# Patient Record
Sex: FEMALE | Race: WHITE | NOT HISPANIC OR LATINO | Employment: FULL TIME | URBAN - METROPOLITAN AREA
[De-identification: names, ages, dates, MRNs, and addresses within clinical notes are randomized per-mention and may not be internally consistent; named-entity substitution may affect disease eponyms.]

---

## 2017-03-26 ENCOUNTER — OFFICE VISIT (OUTPATIENT)
Dept: URGENT CARE | Age: 57
End: 2017-03-26
Payer: COMMERCIAL

## 2017-03-26 PROCEDURE — 99203 OFFICE O/P NEW LOW 30 MIN: CPT | Performed by: FAMILY MEDICINE

## 2017-11-21 ENCOUNTER — OFFICE VISIT (OUTPATIENT)
Dept: URGENT CARE | Age: 57
End: 2017-11-21
Payer: COMMERCIAL

## 2017-11-21 PROCEDURE — 99213 OFFICE O/P EST LOW 20 MIN: CPT

## 2017-11-22 NOTE — PROGRESS NOTES
Assessment    1  Acute sinusitis (461 9) (J01 90)    Plan  Acute sinusitis    · LevoFLOXacin 500 MG Oral Tablet (Levaquin); TAKE 1 TABLET DAILY UNTILFINISHED    Discussion/Summary  Discussion Summary:   Levaquin once daily until finished ( please take probiotics)  sinus medication as needed (try Flonase nasal spray 1 spray in each nostril twice a day)  or Advil / Motrin as needed  and swish mouth with warm salt water or mouthwash  / follow-up with family physician  Medication Side Effects Reviewed: Possible side effects of new medications were reviewed with the patient/guardian today  Understands and agrees with treatment plan: The treatment plan was reviewed with the patient/guardian  The patient/guardian understands and agrees with the treatment plan   Counseling Documentation With Imm: The patient was counseled regarding  Chief Complaint    1  Cold Symptoms  Chief Complaint Free Text Note Form: c/o of sore throat and sinus issues since Saturday      History of Present Illness  HPI: Sore throat and sinus issues; patient states she had a reaction to Augmentin in the past      Review of Systems  Focused-Female:  Constitutional: as noted in HPI   ENT: sore throat-- and-- nasal discharge, but-- as noted in HPI  Cardiovascular: no complaints of slow or fast heart rate, no chest pain, no palpitations, no leg claudication or lower extremity edema  Respiratory: no complaints of shortness of breath, no wheezing, no dyspnea on exertion, no orthopnea or PND  Breasts: no complaints of breast pain, breast lump or nipple discharge  Gastrointestinal: no complaints of abdominal pain, no constipation, no nausea or diarrhea, no vomiting, no bloody stools  Genitourinary: no complaints of dysuria, no incontinence, no pelvic pain, no dysmenorrhea, no vaginal discharge or abnormal vaginal bleeding  Musculoskeletal: no complaints of arthralgia, no myalgia, no joint swelling or stiffness, no limb pain or swelling  Integumentary: no complaints of skin rash or lesion, no itching or dry skin, no skin wounds  Neurological: no complaints of headache, no confusion, no numbness or tingling, no dizziness or fainting  ROS Reviewed:   ROS reviewed  Active Problems  1  Colonoscopy (Fiberoptic) Screening   2  Diarrhea (787 91) (R19 7)   3  Fatigue (780 79) (R53 83)   4  History of migraine (V12 49) (Z86 69)   5  Need for influenza vaccination (V04 81) (Z23)    Past Medical History  1  History of Acute infective pharyngitis (462) (J02 9)   2  History of Acute upper respiratory infection (465 9) (J06 9)   3  History of Anxiety disorder (300 00) (F41 9)   4  History of Depression (311) (F32 9)   5  History of hematuria (V13 09) (Z87 448)   6  History of migraine (V12 49) (Z86 69)   7  History of urinary tract infection (V13 02) (Z87 440)   8  History of Lyme disease (088 81) (A69 20)   9  History of Nephrolithiasis (V13 01)   10  History of Pain in joint of right shoulder region (719 41) (M25 511)   11  History of Pruritus (698 9) (L29 9)   12  History of Vitamin D deficiency (268 9) (E55 9)  Active Problems And Past Medical History Reviewed: The active problems and past medical history were reviewed and updated today  Family History  Mother    1  Family history of Breast Cancer (V16 3)   2  Family history of Hypothyroidism  Sister    3  Family history of Renal Disease  Brother    4  Family history of Prostate Cancer (V16 42)  Maternal Grandmother    5  Family history of Diabetes Mellitus (V18 0)  Paternal Grandmother    10  Family history of Diabetes Mellitus (V18 0)   7  Family history of Hypertension (V17 49)  Maternal Grandfather    8  Family history of Diabetes Mellitus (V18 0)  Paternal Grandfather    5  Family history of Diabetes Mellitus (V18 0)  Family History Reviewed: The family history was reviewed and updated today         Social History   · Being A Social Drinker   · Denied: History of Drug Use   · Never A Smoker  Social History Reviewed: The social history was reviewed and updated today  The social history was reviewed and is unchanged  Surgical History    1  History of Cholecystectomy Laparoscopic   2  History of Jaw Surgery   3  History of Oral Surgery Tooth Extraction   4  History of Tubal Ligation  Surgical History Reviewed: The surgical history was reviewed and updated today  Current Meds   1  Loperamide HCl - 2 MG Oral Capsule; Take 2 now, then 1 after each loose bowel movement  Use no more than 8 in 24 hours; Therapy: [id number] to Recorded   2  Multivitamins TABS; Therapy: ([id number]) to Recorded  Medication List Reviewed: The medication list was reviewed and updated today  Allergies    1  Topamax TABS   2  Inderal LA CP24    Vitals  Signs   Recorded: 21Nov2017 11:55AM   Temperature: 98 6 F  Heart Rate: 88  Respiration: 20  Systolic: 870  Diastolic: 52  Height: 5 ft 6 in  Weight: 145 lb   BMI Calculated: 23 4  BSA Calculated: 1 74  O2 Saturation: 98  Pain Scale: 7-8    Physical Exam   Constitutional  General appearance: No acute distress, well appearing and well nourished  Ears, Nose, Mouth, and Throat  External inspection of ears and nose: Normal    Otoscopic examination: Tympanic membranes translucent with normal light reflex  Canals patent without erythema  -- nasal congestion, tenderness over the sinuses with palpation  -- injection of the oropharynx  Pulmonary  Respiratory effort: No increased work of breathing or signs of respiratory distress  Auscultation of lungs: Clear to auscultation  Cardiovascular  Auscultation of heart: Normal rate and rhythm, normal S1 and S2, without murmurs  Lymphatic  Palpation of lymph nodes in neck: No lymphadenopathy  Skin good color and turgor  Neurologic grossly intact, no nuchal rigidity    Psychiatric  Orientation to person, place, and time: Normal    Mood and affect: Normal        Signatures   Electronically signed by : Mathew Rosales Vignesh Parmar DO; Nov 21 2017 12:06PM EST                       (Author)

## 2018-01-13 NOTE — PROGRESS NOTES
Assessment    1  Encounter for preventive health examination (V70 0) (Z00 00)   2  Pruritus (698 9) (L29 9)    Plan   Health Maintenance    · 1 - Tj CABRALES, Eliana Kruse  (Gastroenterology) Physician Referral  Consult, need for screening  colonoscopy  Status: Active  Requested for: [id number]  Care Summary provided  : Yes  Pruritus    · Loratadine 10 MG Oral Tablet; take 1 tablet daily as needed   · * MAMMO SCREENING BILATERAL W CAD; Status:Hold For - Scheduling; Requested  WIX:[id number];     (1) CBC/PLT/DIFF; Status:Resulted - Requires Verification;   Done: 16TFN2286 12:00AM  Due:30Jun2017;Ordered;    For:Health Maintenance, Pruritus; Ordered By:Linda Miramontes;   (1) COMPREHENSIVE METABOLIC PANEL; Status:Resulted - Requires Verification;   Done: 71JYG4561 12:00AM  Due:30Jun2017;Ordered;    For:Health Maintenance, Pruritus; Ordered By:Linda Miramontes;   (1) LIPID PANEL, FASTING; Status:Resulted - Requires Verification;   Done: 28EQG2812 12:00AM  Due:30Jun2017;Ordered; For:Pruritus; Ordered By:Linda Miramontes;      Discussion/Summary  healthy adult female Currently, she eats a healthy diet and has an adequate exercise regimen  Breast cancer screening: the risks and benefits of breast cancer screening were discussed and mammogram has been ordered  Colorectal cancer screening: the risks and benefits of colorectal cancer screening were discussed and colonoscopy has been ordered  Health Maintenance- Will order screening labs, colonoscopy and mammogram  Continue with varied diet and at least 150 minutes of moderate intensity cardiovascular exercise weekly  Pruritis- Will trial zyrtec at this time  Counselled on proper hydration with vaseline after a shower  Avoid extremes in water temperature when bathing         The patient was counseled regarding instructions for management, risk factor reductions, patient and family education, impressions, risks and benefits of treatment options, importance of compliance with treatment  Self Referrals: No      Chief Complaint  Annual physical      History of Present Illness  , Adult Female: The patient is being seen for a health maintenance evaluation  General Health: The patient's health since the last visit is described as good  She has regular dental visits  She denies vision problems  She denies hearing loss  Immunizations status: up to date  Lifestyle:  She consumes a diverse and healthy diet  She does not have any weight concerns  She exercises regularly  She does not use tobacco  She consumes alcohol  She denies drug use  Screening:   HPI: 56 yo F comes to the office for annual exam  She admits she does not come to the office regularly  She is in generally good health with no major concerns  She sees a dentist regularly and brushes her teeth two times per day  She has no vision problems  She eats a well varied diet and gets regular exercise in the form of walking  She does have concerns of itching, particularly when she gets out of the shower  She has used different types of soaps and body washes, but she seems to get dry, itchy skin after she bathes  No rash or open lesions  Review of Systems    Constitutional: No fever, no chills, feels well, no tiredness, no recent weight gain or weight loss  Eyes: No complaints of eye pain, no red eyes, no eyesight problems, no discharge, no dry eyes, no itching of eyes  ENT: no complaints of earache, no loss of hearing, no nose bleeds, no nasal discharge, no sore throat, no hoarseness  Cardiovascular: No complaints of slow heart rate, no fast heart rate, no chest pain, no palpitations, no leg claudication, no lower extremity edema  Respiratory: No complaints of shortness of breath, no wheezing, no cough, no SOB on exertion, no orthopnea, no PND  Gastrointestinal: No complaints of abdominal pain, no constipation, no nausea or vomiting, no diarrhea, no bloody stools     Genitourinary: No complaints of dysuria, no incontinence, no pelvic pain, no dysmenorrhea, no vaginal discharge or bleeding  Musculoskeletal: No complaints of arthralgias, no myalgias, no joint swelling or stiffness, no limb pain or swelling  Integumentary: itching, but no rashes, no breast pain, no skin lesions, no skin wound and no breast lump  Neurological: No complaints of headache, no confusion, no convulsions, no numbness, no dizziness or fainting, no tingling, no limb weakness, no difficulty walking  Psychiatric: Not suicidal, no sleep disturbance, no anxiety or depression, no change in personality, no emotional problems  Endocrine: No complaints of proptosis, no hot flashes, no muscle weakness, no deepening of the voice, no feelings of weakness  Hematologic/Lymphatic: No complaints of swollen glands, no swollen glands in the neck, does not bleed easily, does not bruise easily  ROS reviewed  Active Problems    1  Acute upper respiratory infection (465 9) (J06 9)   2  Colonoscopy (Fiberoptic) Screening   3  Fatigue (780 79) (R53 83)   4  History of migraine (V12 49) (Z86 69)   5  Need for influenza vaccination (V04 81) (Z23)   6   Urinary tract infection (599 0) (N39 0)    Past Medical History    · History of Acute infective pharyngitis (462) (J02 9)   · History of Anxiety disorder (300 00) (F41 9)   · History of Depression (311) (F32 9)   · History of hematuria (V13 09) (Z87 448)   · History of migraine (V12 49) (Z86 69)   · History of Lyme disease (088 81) (A69 20)   · History of Nephrolithiasis (V13 01)   · History of Pain in joint of right shoulder region (719 41) (M25 511)   · History of Vitamin D deficiency (268 9) (E55 9)    Surgical History    · History of Cholecystectomy Laparoscopic   · History of Jaw Surgery   · History of Oral Surgery Tooth Extraction   · History of Tubal Ligation    Family History  Mother    · Family history of Breast Cancer (V16 3)   · Family history of Hypothyroidism  Sister    · Family history of Renal Disease  Brother    · Family history of Prostate Cancer (V16 42)  Maternal Grandmother    · Family history of Diabetes Mellitus (V18 0)  Paternal Grandmother    · Family history of Diabetes Mellitus (V18 0)   · Family history of Hypertension (V17 49)  Maternal Grandfather    · Family history of Diabetes Mellitus (V18 0)  Paternal Grandfather    · Family history of Diabetes Mellitus (V18 0)    Social History    · Being A Social Drinker   · Denied: History of Drug Use   · Never A Smoker    Current Meds   1  Multivitamins TABS; Therapy: ([id number]) to Recorded    Allergies    1  Topamax TABS   2  Inderal LA CP24    Vitals   Recorded: 46AQD4343 67:37UF   Systolic 816   Diastolic 66   Heart Rate 78   Respiration 16   Temperature 98 4 F   Pain Scale 0   Height 5 ft 6 5 in   Weight 146 lb 2 oz   BMI Calculated 23 23   BSA Calculated 1 76     Physical Exam    Constitutional   General appearance: No acute distress, well appearing and well nourished  Head and Face   Head and face: Normal     Palpation of the face and sinuses: No sinus tenderness  Eyes   Conjunctiva and lids: No swelling, erythema or discharge  Pupils and irises: Equal, round, reactive to light  Ears, Nose, Mouth, and Throat   External inspection of ears and nose: Normal     Otoscopic examination: Tympanic membranes translucent with normal light reflex  Canals patent without erythema  Hearing: Normal     Nasal mucosa, septum, and turbinates: Normal without edema or erythema  Lips, teeth, and gums: Normal, good dentition  Oropharynx: Normal with no erythema, edema, exudate or lesions  Neck   Neck: Supple, symmetric, trachea midline, no masses  Thyroid: Normal, no thyromegaly  Pulmonary   Respiratory effort: No increased work of breathing or signs of respiratory distress  Auscultation of lungs: Clear to auscultation  Cardiovascular   Auscultation of heart: Normal rate and rhythm, normal S1 and S2, no murmurs  Examination of extremities for edema and/or varicosities: Normal     Abdomen   Abdomen: Non-tender, no masses  Liver and spleen: No hepatomegaly or splenomegaly  Lymphatic   Palpation of lymph nodes in neck: No lymphadenopathy  Musculoskeletal   Gait and station: Normal     Digits and nails: Normal without clubbing or cyanosis  Joints, bones, and muscles: Normal     Range of motion: Normal     Stability: Normal     Muscle strength/tone: Normal     Skin   Skin and subcutaneous tissue: Normal without rashes or lesions  Palpation of skin and subcutaneous tissue: Normal turgor  NO rash, no current puritis  Neurologic   Cranial nerves: Cranial nerves II-XII intact  Reflexes: 2+ and symmetric  Sensation: No sensory loss  Psychiatric   Judgment and insight: Normal     Orientation to person, place, and time: Normal     Recent and remote memory: Intact  Mood and affect: Normal        Attending Note  Attending Note: Attending Note: I discussed the case with the Resident and reviewed the Resident's note, I supervised the Resident and I agree with the Resident management plan as it was presented to me  Level of Participation: I was present in clinic, but did not examine the patient  Signatures   Electronically signed by :  Mili Anthony DO; Jul 29 2016  7:49PM EST                       (Author)    Electronically signed by : Telma Augustine DO; Aug  3 2016 10:14PM EST                       (Author)

## 2018-01-31 ENCOUNTER — OFFICE VISIT (OUTPATIENT)
Dept: URGENT CARE | Age: 58
End: 2018-01-31
Payer: COMMERCIAL

## 2018-01-31 VITALS
DIASTOLIC BLOOD PRESSURE: 57 MMHG | SYSTOLIC BLOOD PRESSURE: 120 MMHG | BODY MASS INDEX: 24.43 KG/M2 | HEART RATE: 60 BPM | HEIGHT: 65 IN | OXYGEN SATURATION: 99 % | WEIGHT: 146.6 LBS | TEMPERATURE: 97.7 F | RESPIRATION RATE: 18 BRPM

## 2018-01-31 DIAGNOSIS — R42 VERTIGO: Primary | ICD-10-CM

## 2018-01-31 PROCEDURE — 99213 OFFICE O/P EST LOW 20 MIN: CPT | Performed by: FAMILY MEDICINE

## 2018-01-31 PROCEDURE — 93005 ELECTROCARDIOGRAM TRACING: CPT | Performed by: NURSE PRACTITIONER

## 2018-01-31 RX ORDER — MECLIZINE HYDROCHLORIDE 25 MG/1
25 TABLET ORAL EVERY 8 HOURS PRN
Qty: 15 TABLET | Refills: 0 | Status: SHIPPED | OUTPATIENT
Start: 2018-01-31 | End: 2020-02-10

## 2018-02-01 NOTE — PATIENT INSTRUCTIONS
Rest   Stay hydrate  Start meclizine as prescribed  If symptoms persist or worsen, you develop any new symptoms Benign Paroxysmal Positional Vertigo   WHAT YOU NEED TO KNOW:   BPPV is an inner ear condition that causes you to suddenly feel dizzy  Benign means it is not serious or life-threatening  BPPV is caused by a problem with the nerves and structure of your inner ear  BPPV happens when small pieces of calcium break loose and lump together in one of your inner ear canals  DISCHARGE INSTRUCTIONS:   Return to the emergency department if:   · You fall during a BPPV episode and are injured  · You have a severe headache that does not go away  · You have new changes in your vision or feel weak or confused  · You have problems hearing, or you have ringing or buzzing in your ears  Contact your healthcare provider if:   · Your BPPV symptoms do not go away or they return  · You have problems with your balance, or you are falling often  · You have new or increased nausea or vomiting with vertigo  · You feel anxious or depressed and do not want to leave your home  · You have questions or concerns about your condition or care  Medicines:   · Medicines  may be recommended or prescribed to treat dizziness or nausea  · Take your medicine as directed  Contact your healthcare provider if you think your medicine is not helping or if you have side effects  Tell him of her if you are allergic to any medicine  Keep a list of the medicines, vitamins, and herbs you take  Include the amounts, and when and why you take them  Bring the list or the pill bottles to follow-up visits  Carry your medicine list with you in case of an emergency  Prevent your symptoms:   · Try to avoid sudden head movements  Stand up and lie down slowly  · Raise and support your head when you lie down  Place pillows under your upper back and head or rest in a recliner       · Change your position often when you are lying down   Try not to lie with your head on the same side for long periods of time  Roll over slowly  · Wear protective gear  when you ride a bike or play sports  A helmet helps protect your head from injury  Follow up with your healthcare provider as directed: You may need to return in 1 month to check the progress of your treatment  Write down your questions so you remember to ask them during your visits  © 2017 [employer]  Information is for End User's use only and may not be sold, redistributed or otherwise used for commercial purposes  All illustrations and images included in CareNotes® are the copyrighted property of [employer]  or [employer]  The above information is an  only  It is not intended as medical advice for individual conditions or treatments  Talk to your doctor, nurse or pharmacist before following any medical regimen to see if it is safe and effective for you  go directly to ER for full work up  EKG: sinus suleiman  Follow up with PCP in the next 2-3 days

## 2018-02-01 NOTE — PROGRESS NOTES
Assessment/Plan:     Diagnoses and all orders for this visit:    Vertigo  -     meclizine (ANTIVERT) 25 mg tablet; Take 1 tablet (25 mg total) by mouth every 8 (eight) hours as needed for dizziness for up to 5 days    Other orders  -     ECG 12 lead    Rest   Stay hydrate  Start meclizine as prescribed  If symptoms persist or worsen, you develop any new symptoms  Follow up with PCP in next 2-3 days  Subjective:      Patient ID: Sam Crawford is a 65 y o  female  This is a 65year old female here today with 3 episodes of dizziness  She states last week she was sitting at her desk and tilted her head  She then felt dizzy and off balance  No chest pain, SOB, numbness or tingling associated with episode  She felt as though things were spinning  Yesterday and Last night she had another episode with onset of dizziness with moving her head  She states when she layed down things were spinning when she laid down on her left side  She feels as though things are spinning when she moves her head  No visual changes, no headaches  She had some nausea yesterday with episode  No history of vertigo  At this time she feels slightly off balance  No URI symptoms  Dizziness   Associated symptoms include nausea  Pertinent negatives include no chest pain, congestion, coughing, fatigue, fever, numbness or weakness  Nausea   Associated symptoms include nausea  Pertinent negatives include no chest pain, congestion, coughing, fatigue, fever, numbness or weakness  The following portions of the patient's history were reviewed and updated as appropriate: allergies, current medications, past family history, past medical history, past social history, past surgical history and problem list     Review of Systems   Constitutional: Negative for activity change, fatigue and fever  HENT: Negative for congestion, sinus pain, sinus pressure and tinnitus  Respiratory: Negative for cough and shortness of breath  Cardiovascular: Negative for chest pain and palpitations  Gastrointestinal: Positive for nausea  Neurological: Positive for dizziness  Negative for tremors, syncope, weakness, light-headedness and numbness  Objective:  EKG: sinus suleiman   /57 (BP Location: Right arm, Patient Position: Sitting, Cuff Size: Standard)   Pulse 60   Temp 97 7 °F (36 5 °C) (Oral)   Resp 18   Ht 5' 5" (1 651 m)   Wt 66 5 kg (146 lb 9 6 oz)   SpO2 99%   BMI 24 40 kg/m²      Physical Exam   Constitutional: She is oriented to person, place, and time  She appears well-developed and well-nourished  HENT:   Head: Normocephalic  Eyes: Conjunctivae are normal  Pupils are equal, round, and reactive to light  Neck: Normal range of motion  Neck supple  Cardiovascular: Normal rate and regular rhythm  Pulmonary/Chest: Effort normal and breath sounds normal    Neurological: She is alert and oriented to person, place, and time  Coordination normal    Negative Rhomberg    Skin: Skin is warm  Psychiatric: She has a normal mood and affect

## 2018-02-01 NOTE — PROGRESS NOTES
Had episode of dizziness last week - lasted one afternoon  Reoccurred during night and continues with feeling "off balance" and "room spinning" when supine  Intermittent nausea  No visual changes

## 2018-02-02 LAB
ATRIAL RATE: 57 BPM
P AXIS: 64 DEGREES
PR INTERVAL: 130 MS
QRS AXIS: 68 DEGREES
QRSD INTERVAL: 84 MS
QT INTERVAL: 388 MS
QTC INTERVAL: 377 MS
T WAVE AXIS: 45 DEGREES
VENTRICULAR RATE: 57 BPM

## 2018-04-19 ENCOUNTER — OFFICE VISIT (OUTPATIENT)
Dept: FAMILY MEDICINE CLINIC | Facility: CLINIC | Age: 58
End: 2018-04-19
Payer: COMMERCIAL

## 2018-04-19 VITALS
RESPIRATION RATE: 16 BRPM | BODY MASS INDEX: 23.27 KG/M2 | HEIGHT: 66 IN | WEIGHT: 144.8 LBS | HEART RATE: 66 BPM | SYSTOLIC BLOOD PRESSURE: 110 MMHG | TEMPERATURE: 97 F | DIASTOLIC BLOOD PRESSURE: 70 MMHG

## 2018-04-19 DIAGNOSIS — Z12.11 SCREEN FOR COLON CANCER: ICD-10-CM

## 2018-04-19 DIAGNOSIS — R42 DIZZINESS: ICD-10-CM

## 2018-04-19 DIAGNOSIS — Z12.39 SCREENING FOR BREAST CANCER: ICD-10-CM

## 2018-04-19 DIAGNOSIS — Z00.00 HEALTHCARE MAINTENANCE: Primary | ICD-10-CM

## 2018-04-19 PROCEDURE — 99396 PREV VISIT EST AGE 40-64: CPT | Performed by: FAMILY MEDICINE

## 2018-04-19 NOTE — PROGRESS NOTES
Rufina Meraz 1960 female MRN: [id number]    Health Maintenance Visit      SUBJECTIVE  HPI:  Lord Armijo is a 63 y o  female who presented for a routine health maintenance visit  The patient has been in otherwise good general health in the past   C/o occasional dizziness, no vertigo, no other associated symptoms and denies dehydration or orthostatic symptoms, described in ROS  Currently with mild URI, resolving, no cough, just runny nose and some congestion  Has never had a colonoscopy  "Been a while" since she got her mammogram   Previous PAPs were normal  S/p tubal ligation  5-10 years ago was last pap  Health Maintenance   Topic Date Due    HIV SCREENING  1960    Hepatitis C Screening  1960    COLONOSCOPY  1960    INFLUENZA VACCINE  09/01/2017    Depression Screening PHQ-9  01/31/2019    DTaP,Tdap,and Td Vaccines (2 - Td) 01/07/2023       Immunization History   Administered Date(s) Administered    Tdap 01/07/2013       Review of Systems   Constitutional: Negative for activity change, appetite change, chills, diaphoresis, fatigue, fever and unexpected weight change  HENT: Negative for sinus pain and sinus pressure  Eyes: Negative for visual disturbance  Respiratory: Negative for apnea, cough, chest tightness, shortness of breath and wheezing  Cardiovascular: Negative for chest pain, palpitations and leg swelling  Gastrointestinal: Negative for abdominal pain, constipation, diarrhea and vomiting  Genitourinary: Negative for vaginal bleeding  Musculoskeletal: Negative for gait problem  Skin: Negative for rash  Neurological: Positive for dizziness  Negative for seizures, syncope, weakness, light-headedness and headaches  Initially began 1 month ago, lasted 48 hours, self-resolved  Has occurred 3 times since then, lasting 24 hours the next time, then lasting only a few hours the 3rd time, better with rest, worse on R side one of the times  Psychiatric/Behavioral: Negative for dysphoric mood and suicidal ideas  Historical Information   Past Medical History:   Diagnosis Date    Anxiety disorder     Last Assessed:11/12/2013    Hematuria     Last Assessed:12/10/2012    Lyme disease     Last Assessed:12/10/2012    Migraine     Last Assessed:3/20/2014    Vitamin D deficiency     Last Assessed:3/20/2014     Past Surgical History:   Procedure Laterality Date    CHOLECYSTECTOMY      MANDIBLE FRACTURE SURGERY      MANDIBLE SURGERY      wired after broken    TUBAL LIGATION      WISDOM TOOTH EXTRACTION      WRIST SURGERY       Social History   History   Alcohol Use    0 6 oz/week    1 Glasses of wine per week     Comment: monthly ; social     History   Drug Use No     History   Smoking Status    Never Smoker   Smokeless Tobacco    Never Used     Family History:   Family History   Problem Relation Age of Onset   Lizy Phelan Breast cancer Mother     Hypothyroidism Mother     Other Sister      renal disease    Prostate cancer Brother     Diabetes Maternal Grandmother     Diabetes Maternal Grandfather     Diabetes Paternal Grandmother     Hypertension Paternal Grandmother     Diabetes Paternal Grandfather        Medications:  Meds/Allergies   Current Outpatient Prescriptions   Medication Sig Dispense Refill    meclizine (ANTIVERT) 25 mg tablet Take 1 tablet (25 mg total) by mouth every 8 (eight) hours as needed for dizziness for up to 5 days 15 tablet 0     No current facility-administered medications for this visit          Allergies   Allergen Reactions    Propranolol Fatigue    Augmentin [Amoxicillin-Pot Clavulanate] Rash    Topiramate Other (See Comments)     depression       OBJECTIVE  Vitals:   Vitals:    04/19/18 1621   Weight: 65 7 kg (144 lb 12 8 oz)   Height: 5' 5 6" (1 666 m)       Invasive Devices          No matching active lines, drains, or airways          No exam data present     Physical Exam   Constitutional: She is oriented to person, place, and time  She appears well-developed and well-nourished  No distress  HENT:   Head: Normocephalic and atraumatic  Right Ear: External ear normal    Left Ear: External ear normal    Mouth/Throat: Oropharynx is clear and moist  No oropharyngeal exudate  Eyes: Conjunctivae and EOM are normal  Pupils are equal, round, and reactive to light  Neck: Normal range of motion  Cardiovascular: Normal rate, regular rhythm and normal heart sounds  No murmur heard  Pulmonary/Chest: Effort normal and breath sounds normal  No respiratory distress  She has no wheezes  She has no rales  Abdominal: Soft  Bowel sounds are normal  She exhibits no distension  There is no tenderness  There is no rebound and no guarding  Musculoskeletal: Normal range of motion  She exhibits no edema  Neurological: She is alert and oriented to person, place, and time  No cranial nerve deficit  She exhibits normal muscle tone  Coordination normal    Skin: No rash noted  She is not diaphoretic  No pallor  Psychiatric: She has a normal mood and affect  Her behavior is normal    Vitals reviewed  Lab:  I have personally reviewed all prior pertinent results  Office Visit on 01/31/2018   Component Date Value Ref Range Status    Ventricular Rate 01/31/2018 57  BPM Final    Atrial Rate 01/31/2018 57  BPM Final    MD Interval 01/31/2018 130  ms Final    QRSD Interval 01/31/2018 84  ms Final    QT Interval 01/31/2018 388  ms Final    QTC Interval 01/31/2018 377  ms Final    P Axis 01/31/2018 64  degrees Final    QRS Axis 01/31/2018 68  degrees Final    T Wave Axis 01/31/2018 45  degrees Final   ]    Imaging:  I have personally reviewed all prior pertinent results  Assessment/Plan     Problem List Items Addressed This Visit     None      Visit Diagnoses     Healthcare maintenance    -  Primary        HM  - Vaccines: Per review above     - Mammogram: Ordered referral   - PAP: Recommended scheduling   - Colonoscopy: Ordered referral     Body mass index is 23 66 kg/m²  The ASCVD Risk score (Ronaldo Mei , et al , 2013) failed to calculate for the following reasons:    ASCVD risk score not calculated  -> Counseled on diet, weight maintenance, aerobic and weight-bearing exercise  PMH, stable, chronic  -> Continuing all home medications or alternatives as reviewed and reconciled  Disposition: Anticipate stable interim  - Code: Full   - Diet: Standard American Diet, recommended Heart Healthy  - Ambulation: No dysfunction at baseline   - Housing: Home  Self-care  - PCP: Dr Clifford Hwang, [facility]   - Follow-up recommendations:     No future appointments         _____________________________________________________________________   The attending physician, Dr Hunter Gordon, agreed with the plan      Reji Schroeder DO, PGY-3  [facility]   4/19/2018

## 2018-06-03 LAB — TSH SERPL-ACNC: 2.67 MIU/L (ref 0.4–4.5)

## 2018-06-04 ENCOUNTER — ANNUAL EXAM (OUTPATIENT)
Dept: FAMILY MEDICINE CLINIC | Facility: CLINIC | Age: 58
End: 2018-06-04
Payer: COMMERCIAL

## 2018-06-04 VITALS
SYSTOLIC BLOOD PRESSURE: 110 MMHG | HEART RATE: 80 BPM | RESPIRATION RATE: 16 BRPM | DIASTOLIC BLOOD PRESSURE: 70 MMHG | BODY MASS INDEX: 23.59 KG/M2 | WEIGHT: 146.8 LBS | TEMPERATURE: 97.6 F | HEIGHT: 66 IN

## 2018-06-04 DIAGNOSIS — Z01.419 ENCNTR FOR GYN EXAM (GENERAL) (ROUTINE) W/O ABN FINDINGS: Primary | ICD-10-CM

## 2018-06-04 DIAGNOSIS — Z12.4 PAP SMEAR FOR CERVICAL CANCER SCREENING: ICD-10-CM

## 2018-06-04 PROCEDURE — G0145 SCR C/V CYTO,THINLAYER,RESCR: HCPCS | Performed by: FAMILY MEDICINE

## 2018-06-04 PROCEDURE — 87624 HPV HI-RISK TYP POOLED RSLT: CPT | Performed by: FAMILY MEDICINE

## 2018-06-04 PROCEDURE — 99213 OFFICE O/P EST LOW 20 MIN: CPT | Performed by: FAMILY MEDICINE

## 2018-06-04 NOTE — PROGRESS NOTES
Eleonora Meraz 1960 female MRN: [id number]    CC: Annual Gyn     HPI: Nay Parker is a 63 y o  G5K5 uncomplicated full term deliveries who presents for pap  Past Medical History:  Past Medical History:   Diagnosis Date    Anxiety disorder     Last Assessed:11/12/2013    Hematuria     Last Assessed:12/10/2012    Lyme disease     Last Assessed:12/10/2012    Migraine     Last Assessed:3/20/2014    Vitamin D deficiency     Last Assessed:3/20/2014       Past Surgical History:  Past Surgical History:   Procedure Laterality Date    CHOLECYSTECTOMY      MANDIBLE FRACTURE SURGERY      MANDIBLE SURGERY      wired after broken    TUBAL LIGATION      WISDOM TOOTH EXTRACTION      WRIST SURGERY         Past OB/Gyn History:  OB History     No data available      Her menstrual cycles are no longer present  Denies any history of sexually transmitted infection  No history of abnormal pap smears  Her last pap smear was years ago- unknown exact date  Family History:  Family History   Problem Relation Age of Onset   Levora Reed Breast cancer Mother     Hypothyroidism Mother     Other Sister      renal disease    Prostate cancer Brother     Diabetes Maternal Grandmother     Diabetes Maternal Grandfather     Diabetes Paternal Grandmother     Hypertension Paternal Grandmother     Diabetes Paternal Grandfather        Social History:  Social History     Social History    Marital status: /Civil Union     Spouse name: N/A    Number of children: N/A    Years of education: N/A     Occupational History    Not on file       Social History Main Topics    Smoking status: Never Smoker    Smokeless tobacco: Never Used    Alcohol use 0 6 oz/week     1 Glasses of wine per week      Comment: monthly ; social    Drug use: No    Sexual activity: Not on file     Other Topics Concern    Not on file     Social History Narrative    No narrative on file         Review of Systems:  Skin: No rashes or discolorations of any concern  RESP: Denies SOB, no cough  CV: Denies chest pain or palpitations  Breasts: Denies masses, pain and nipple discharge  GI: Denies abdominal pain  Denies changes in bowel habits  : Denies dysuria, frequency, incontinence and hematuria  Genitalia: Denies abnormal vaginal discharge  Physical Exam:  There were no vitals taken for this visit  GEN: The patient was alert and oriented x3, pleasant well-appearing female in no acute distress  HEENT:  Unremarkable, no anterior or posterior lymphadenopathy, no thyromegaly  CV:  RRR, no murmurs  BREASTS: Breasts:  Symmetric  No dominant, discrete, fixed  or suspicious masses are noted  No skin or nipple changes  No palpable axillary nodes  RESP:  Clear to auscultation bilaterally  ABD:  Soft, nontender, nondistended, no masses or organomegaly  BACK:  No CVA tenderness, no tenderness to palpation along spine  PELVIC:  Normal appearing external female genitalia, normal vaginal epithelium, no abnormal discharge  Normal appearing cervix  Uterus is smooth, mobile and, nontender  No palpable adnexal masses  No anoperineal lesions  Rectovaginal exam was WNL  A thin prep pap smear was obtained  SKIN:  No concerning lesions  EXTREMITIES: No edema      Assessment & Plan:   1  Routine annual exam    We discussed ACOG guidelines for pap smear screening frequency  We discussed healthy diet, exercise, self breast awareness  RTC one year or earlier PRN  The attending physician, Dr Jody Henderson, agreed with the plan      Ban Swann DO, PGY-3  [place] [id number]

## 2018-06-07 LAB — HPV RRNA GENITAL QL NAA+PROBE: NORMAL

## 2018-06-08 LAB
LAB AP GYN PRIMARY INTERPRETATION: NORMAL
Lab: NORMAL

## 2018-06-11 NOTE — PROGRESS NOTES
Please call the patient and let her know her pap smear was normal  She is due for another PAP in 5 years, however she should continue to receive yearly mammograms  Thanks!

## 2019-10-25 ENCOUNTER — OFFICE VISIT (OUTPATIENT)
Dept: FAMILY MEDICINE CLINIC | Facility: CLINIC | Age: 59
End: 2019-10-25

## 2019-10-25 VITALS
WEIGHT: 148.2 LBS | BODY MASS INDEX: 24.07 KG/M2 | SYSTOLIC BLOOD PRESSURE: 102 MMHG | DIASTOLIC BLOOD PRESSURE: 78 MMHG | RESPIRATION RATE: 16 BRPM | HEART RATE: 68 BPM | TEMPERATURE: 97.9 F

## 2019-10-25 DIAGNOSIS — R30.0 DYSURIA: Primary | ICD-10-CM

## 2019-10-25 LAB
BACTERIA UR QL AUTO: NORMAL /HPF
BILIRUB UR QL STRIP: NEGATIVE
CLARITY UR: CLEAR
COLOR UR: YELLOW
GLUCOSE UR STRIP-MCNC: NEGATIVE MG/DL
HGB UR QL STRIP.AUTO: ABNORMAL
HYALINE CASTS #/AREA URNS LPF: NORMAL /LPF
KETONES UR STRIP-MCNC: NEGATIVE MG/DL
LEUKOCYTE ESTERASE UR QL STRIP: NEGATIVE
NITRITE UR QL STRIP: NEGATIVE
NON-SQ EPI CELLS URNS QL MICRO: NORMAL /HPF
PH UR STRIP.AUTO: 5.5 [PH]
PROT UR STRIP-MCNC: NEGATIVE MG/DL
RBC #/AREA URNS AUTO: NORMAL /HPF
SL AMB  POCT GLUCOSE, UA: NEGATIVE
SL AMB LEUKOCYTE ESTERASE,UA: NEGATIVE
SL AMB POCT BILIRUBIN,UA: NEGATIVE
SL AMB POCT BLOOD,UA: ABNORMAL
SL AMB POCT CLARITY,UA: CLEAR
SL AMB POCT COLOR,UA: YELLOW
SL AMB POCT KETONES,UA: NEGATIVE
SL AMB POCT NITRITE,UA: NEGATIVE
SL AMB POCT PH,UA: 6
SL AMB POCT SPECIFIC GRAVITY,UA: 1
SL AMB POCT URINE PROTEIN: NEGATIVE
SL AMB POCT UROBILINOGEN: 0.2
SP GR UR STRIP.AUTO: 1.01 (ref 1–1.03)
UROBILINOGEN UR QL STRIP.AUTO: 0.2 E.U./DL
WBC #/AREA URNS AUTO: NORMAL /HPF

## 2019-10-25 PROCEDURE — 81001 URINALYSIS AUTO W/SCOPE: CPT | Performed by: FAMILY MEDICINE

## 2019-10-25 PROCEDURE — 99213 OFFICE O/P EST LOW 20 MIN: CPT | Performed by: FAMILY MEDICINE

## 2019-10-25 PROCEDURE — 81002 URINALYSIS NONAUTO W/O SCOPE: CPT | Performed by: FAMILY MEDICINE

## 2019-10-25 PROCEDURE — 1036F TOBACCO NON-USER: CPT | Performed by: FAMILY MEDICINE

## 2019-10-25 RX ORDER — PHENAZOPYRIDINE HYDROCHLORIDE 200 MG/1
200 TABLET, FILM COATED ORAL
Qty: 6 TABLET | Refills: 0 | Status: SHIPPED | OUTPATIENT
Start: 2019-10-25 | End: 2019-10-27

## 2019-10-25 NOTE — PROGRESS NOTES
Assessment/Plan:    Dysuria  - POC UA + for trace blood only  - Will send out for Cx  - Pyridium   - RTC precautions discussed           Problem List Items Addressed This Visit        Other    Dysuria - Primary     - POC UA + for trace blood only  - Will send out for Cx  - Pyridium   - RTC precautions discussed           Relevant Medications    phenazopyridine (PYRIDIUM) 200 mg tablet    Other Relevant Orders    POCT urine dip (Completed)    UA w Reflex to Microscopic w Reflex to Culture - Clinic Collect            Patient noted to have FMHx of Breast Ca, no mammo on file  Advised to set up annual visit and stressed the importance of screening  Subjective:      Patient ID: Bartolo Ann is a 63 y o  female  Difficulty Urinating    This is a new problem  The current episode started 1 to 4 weeks ago (few weeks ago)  The problem occurs intermittently  The problem has been unchanged  The quality of the pain is described as burning  The pain is moderate  There has been no fever  She is not sexually active  There is no history of pyelonephritis  Pertinent negatives include no chills, discharge, flank pain, frequency, hematuria, hesitancy, nausea, sweats, urgency or vomiting  She has tried nothing for the symptoms  Her past medical history is significant for kidney stones (8-9 years ago)  There is no history of recurrent UTIs, a single kidney, urinary stasis or a urological procedure  The following portions of the patient's history were reviewed and updated as appropriate: allergies, current medications, past family history, past medical history, past social history and past surgical history  Review of Systems   Constitutional: Negative for chills and fever  HENT: Negative for congestion, postnasal drip and rhinorrhea  Eyes: Negative for discharge  Respiratory: Negative for cough, chest tightness and shortness of breath  Cardiovascular: Negative for chest pain, palpitations and leg swelling  Gastrointestinal: Negative for nausea and vomiting  Genitourinary: Positive for dysuria  Negative for difficulty urinating, flank pain, frequency, hematuria, hesitancy, urgency and vaginal discharge  Musculoskeletal: Negative for gait problem  Skin: Negative for rash and wound  Allergic/Immunologic: Negative for environmental allergies and food allergies  Neurological: Negative for dizziness, light-headedness and headaches  Psychiatric/Behavioral: Negative for decreased concentration and dysphoric mood  Objective:      /78 (BP Location: Left arm, Patient Position: Sitting, Cuff Size: Standard)   Pulse 68   Temp 97 9 °F (36 6 °C) (Tympanic)   Resp 16   Wt 67 2 kg (148 lb 3 2 oz)   BMI 24 07 kg/m²          Physical Exam   Constitutional: She is oriented to person, place, and time  She appears well-developed and well-nourished  No distress  HENT:   Head: Normocephalic and atraumatic  Mouth/Throat: Oropharynx is clear and moist  No oropharyngeal exudate  Eyes: EOM are normal    Neck: Normal range of motion  Cardiovascular: Normal rate, regular rhythm and normal heart sounds  Pulmonary/Chest: Effort normal and breath sounds normal  No respiratory distress  She has no wheezes  Abdominal: Soft  Bowel sounds are normal  She exhibits no distension  There is no tenderness  No suprapubic tenderness  Musculoskeletal:   No CVA tenderness     Neurological: She is alert and oriented to person, place, and time  Skin: Skin is warm and dry  Psychiatric: She has a normal mood and affect  Vitals reviewed

## 2020-02-10 ENCOUNTER — OFFICE VISIT (OUTPATIENT)
Dept: FAMILY MEDICINE CLINIC | Facility: CLINIC | Age: 60
End: 2020-02-10

## 2020-02-10 VITALS
SYSTOLIC BLOOD PRESSURE: 122 MMHG | DIASTOLIC BLOOD PRESSURE: 80 MMHG | BODY MASS INDEX: 23.12 KG/M2 | HEART RATE: 82 BPM | RESPIRATION RATE: 16 BRPM | WEIGHT: 142.4 LBS | TEMPERATURE: 97.5 F

## 2020-02-10 DIAGNOSIS — F43.20 ADJUSTMENT DISORDER, UNSPECIFIED TYPE: ICD-10-CM

## 2020-02-10 DIAGNOSIS — Z12.39 SCREENING FOR BREAST CANCER: ICD-10-CM

## 2020-02-10 DIAGNOSIS — Z12.11 SCREEN FOR COLON CANCER: ICD-10-CM

## 2020-02-10 DIAGNOSIS — R30.0 DYSURIA: ICD-10-CM

## 2020-02-10 DIAGNOSIS — Z11.4 SCREENING FOR HIV (HUMAN IMMUNODEFICIENCY VIRUS): Primary | ICD-10-CM

## 2020-02-10 DIAGNOSIS — Z11.59 ENCOUNTER FOR HEPATITIS C SCREENING TEST FOR LOW RISK PATIENT: ICD-10-CM

## 2020-02-10 LAB
BACTERIA UR QL AUTO: ABNORMAL /HPF
BILIRUB UR QL STRIP: NEGATIVE
CLARITY UR: CLEAR
COLOR UR: YELLOW
GLUCOSE UR STRIP-MCNC: NEGATIVE MG/DL
HGB UR QL STRIP.AUTO: ABNORMAL
HYALINE CASTS #/AREA URNS LPF: ABNORMAL /LPF
KETONES UR STRIP-MCNC: NEGATIVE MG/DL
LEUKOCYTE ESTERASE UR QL STRIP: NEGATIVE
NITRITE UR QL STRIP: NEGATIVE
NON-SQ EPI CELLS URNS QL MICRO: ABNORMAL /HPF
PH UR STRIP.AUTO: 6 [PH]
PROT UR STRIP-MCNC: NEGATIVE MG/DL
RBC #/AREA URNS AUTO: ABNORMAL /HPF
SL AMB  POCT GLUCOSE, UA: NEGATIVE
SL AMB LEUKOCYTE ESTERASE,UA: NEGATIVE
SL AMB POCT BILIRUBIN,UA: NEGATIVE
SL AMB POCT BLOOD,UA: ABNORMAL
SL AMB POCT CLARITY,UA: CLEAR
SL AMB POCT COLOR,UA: YELLOW
SL AMB POCT KETONES,UA: NEGATIVE
SL AMB POCT NITRITE,UA: NEGATIVE
SL AMB POCT PH,UA: 6
SL AMB POCT SPECIFIC GRAVITY,UA: 1.01
SL AMB POCT URINE PROTEIN: NEGATIVE
SL AMB POCT UROBILINOGEN: 0.2
SP GR UR STRIP.AUTO: 1.01 (ref 1–1.03)
UROBILINOGEN UR QL STRIP.AUTO: 0.2 E.U./DL
WBC #/AREA URNS AUTO: ABNORMAL /HPF

## 2020-02-10 PROCEDURE — 81001 URINALYSIS AUTO W/SCOPE: CPT | Performed by: FAMILY MEDICINE

## 2020-02-10 PROCEDURE — 1036F TOBACCO NON-USER: CPT | Performed by: FAMILY MEDICINE

## 2020-02-10 PROCEDURE — 81002 URINALYSIS NONAUTO W/O SCOPE: CPT | Performed by: FAMILY MEDICINE

## 2020-02-10 PROCEDURE — 99213 OFFICE O/P EST LOW 20 MIN: CPT | Performed by: FAMILY MEDICINE

## 2020-02-10 RX ORDER — PHENAZOPYRIDINE HYDROCHLORIDE 200 MG/1
200 TABLET, FILM COATED ORAL
Qty: 10 TABLET | Refills: 0 | Status: SHIPPED | OUTPATIENT
Start: 2020-02-10 | End: 2020-05-21 | Stop reason: ALTCHOICE

## 2020-02-11 NOTE — ASSESSMENT & PLAN NOTE
Urology referral  Urine Cx  U dip: trace blood  Most likely related to postmenopausal state, however r/o urologic issues

## 2020-02-11 NOTE — PROGRESS NOTES
Assessment/Plan:    Adjustment disorder  ESTHER-7 Flowsheet Screening      Most Recent Value   Over the last two weeks, how often have you been bothered by the following problems? Feeling nervous, anxious, or on edge  1   Not being able to stop or control worrying  3   Worrying too much about different things  3   Trouble relaxing   1   Being so restless that it's hard to sit still  1   Becoming easily annoyed or irritable   0   Feeling afraid as if something awful might happen  1   How difficult have these problems made it for you to do your work, take care of things at home, or get along with other people? Not difficult at all   ESTHER Score   10        - 2/2 to worsening status of mothers Breast Ca  - advised to f/u with support group  - RTC ED precautions discussed  - no meds or psych referral at this time    Dysuria  Urology referral  Urine Cx  U dip: trace blood  Most likely related to postmenopausal state, however r/o urologic issues       Diagnoses and all orders for this visit:    Screening for HIV (human immunodeficiency virus)  -     HIV 1/2 AG-AB combo; Future    Encounter for hepatitis C screening test for low risk patient  -     Hepatitis C antibody; Future    Screen for colon cancer  -     Ambulatory referral to Gastroenterology; Future    Screening for breast cancer  -     Mammo screening bilateral w cad; Future    Dysuria  -     Ambulatory referral to Urology; Future  -     phenazopyridine (PYRIDIUM) 200 mg tablet; Take 1 tablet (200 mg total) by mouth 3 (three) times a day with meals  -     POCT urine dip  -     UA w Reflex to Microscopic w Reflex to Culture - Clinic Collect    Adjustment disorder, unspecified type          Subjective:      Patient ID: Vern Dhaliwal is a 63 y o  female  HPI   Dysuria:  - reports no resolution since last visit several months ago  - has not used lubrication  - no f/v/n/c/flank pain, see ROS    2   Anxiety  - mother's breast ca worsening as of weeks ago, dx 8 years ago  - no issues prior        The following portions of the patient's history were reviewed and updated as appropriate: allergies and past family history  Review of Systems   Constitutional: Negative for activity change, appetite change, chills, diaphoresis, fatigue and fever  Gastrointestinal: Negative for abdominal distention, abdominal pain, blood in stool, constipation, diarrhea, nausea and vomiting  Genitourinary: Positive for dysuria  Negative for decreased urine volume, urgency and vaginal bleeding  Musculoskeletal: Negative for back pain  Neurological: Negative for headaches  Psychiatric/Behavioral: Negative for dysphoric mood, self-injury and sleep disturbance  The patient is nervous/anxious  Objective:      /80 (BP Location: Left arm, Patient Position: Sitting, Cuff Size: Standard)   Pulse 82   Temp 97 5 °F (36 4 °C) (Tympanic)   Resp 16   Wt 64 6 kg (142 lb 6 4 oz)   BMI 23 12 kg/m²          Physical Exam   Constitutional: She appears well-developed and well-nourished  No distress  HENT:   Head: Normocephalic and atraumatic  Mouth/Throat: Oropharynx is clear and moist  No oropharyngeal exudate  Eyes: Conjunctivae are normal    Neck: Normal range of motion  Cardiovascular: Normal rate, regular rhythm and normal heart sounds  Pulmonary/Chest: Effort normal and breath sounds normal  No respiratory distress  She has no wheezes  Abdominal: Soft  Bowel sounds are normal  She exhibits no distension  There is no tenderness  There is no rebound and no guarding  Musculoskeletal:   No CVA or suprapubic tenderness   Neurological: She is alert  Skin: Skin is warm and dry  She is not diaphoretic  Psychiatric: She has a normal mood and affect  Vitals reviewed

## 2020-02-11 NOTE — ASSESSMENT & PLAN NOTE
ESTHER-7 Flowsheet Screening      Most Recent Value   Over the last two weeks, how often have you been bothered by the following problems? Feeling nervous, anxious, or on edge  1   Not being able to stop or control worrying  3   Worrying too much about different things  3   Trouble relaxing   1   Being so restless that it's hard to sit still  1   Becoming easily annoyed or irritable   0   Feeling afraid as if something awful might happen  1   How difficult have these problems made it for you to do your work, take care of things at home, or get along with other people?    Not difficult at all   ESTHER Score   10        - 2/2 to worsening status of mothers Breast Ca  - advised to f/u with support group  - RTC ED precautions discussed  - no meds or psych referral at this time

## 2020-03-12 ENCOUNTER — TRANSCRIBE ORDERS (OUTPATIENT)
Dept: ADMINISTRATIVE | Facility: HOSPITAL | Age: 60
End: 2020-03-12

## 2020-03-12 ENCOUNTER — LAB REQUISITION (OUTPATIENT)
Dept: LAB | Facility: HOSPITAL | Age: 60
End: 2020-03-12
Payer: COMMERCIAL

## 2020-03-12 DIAGNOSIS — N39.0 URINARY TRACT INFECTION WITHOUT HEMATURIA, SITE UNSPECIFIED: Primary | ICD-10-CM

## 2020-03-12 DIAGNOSIS — R31.9 HEMATURIA, UNSPECIFIED: ICD-10-CM

## 2020-03-12 PROCEDURE — 88112 CYTOPATH CELL ENHANCE TECH: CPT | Performed by: PATHOLOGY

## 2020-04-10 ENCOUNTER — TELEPHONE (OUTPATIENT)
Dept: FAMILY MEDICINE CLINIC | Facility: CLINIC | Age: 60
End: 2020-04-10

## 2020-05-08 ENCOUNTER — TRANSCRIBE ORDERS (OUTPATIENT)
Dept: LAB | Facility: CLINIC | Age: 60
End: 2020-05-08

## 2020-05-08 ENCOUNTER — HOSPITAL ENCOUNTER (OUTPATIENT)
Dept: CT IMAGING | Facility: HOSPITAL | Age: 60
Discharge: HOME | End: 2020-05-08
Payer: COMMERCIAL

## 2020-05-08 ENCOUNTER — APPOINTMENT (OUTPATIENT)
Dept: LAB | Facility: CLINIC | Age: 60
End: 2020-05-08
Payer: COMMERCIAL

## 2020-05-08 DIAGNOSIS — N39.0 URINARY TRACT INFECTION WITHOUT HEMATURIA, SITE UNSPECIFIED: ICD-10-CM

## 2020-05-08 DIAGNOSIS — N39.0 URINARY TRACT INFECTION WITHOUT HEMATURIA, SITE UNSPECIFIED: Primary | ICD-10-CM

## 2020-05-08 LAB
BUN SERPL-MCNC: 14 MG/DL (ref 5–25)
CREAT SERPL-MCNC: 0.81 MG/DL (ref 0.6–1.3)
GFR SERPL CREATININE-BSD FRML MDRD: 80 ML/MIN/1.73SQ M

## 2020-05-08 PROCEDURE — 36415 COLL VENOUS BLD VENIPUNCTURE: CPT

## 2020-05-08 PROCEDURE — 74176 CT ABD & PELVIS W/O CONTRAST: CPT

## 2020-05-08 PROCEDURE — 84520 ASSAY OF UREA NITROGEN: CPT

## 2020-05-08 PROCEDURE — 82565 ASSAY OF CREATININE: CPT

## 2020-05-20 ENCOUNTER — TELEPHONE (OUTPATIENT)
Dept: OBGYN CLINIC | Facility: CLINIC | Age: 60
End: 2020-05-20

## 2020-05-21 ENCOUNTER — OFFICE VISIT (OUTPATIENT)
Dept: OBGYN CLINIC | Facility: CLINIC | Age: 60
End: 2020-05-21
Payer: COMMERCIAL

## 2020-05-21 VITALS
TEMPERATURE: 98.4 F | WEIGHT: 139.6 LBS | DIASTOLIC BLOOD PRESSURE: 72 MMHG | SYSTOLIC BLOOD PRESSURE: 118 MMHG | BODY MASS INDEX: 22.67 KG/M2

## 2020-05-21 DIAGNOSIS — R10.2 PELVIC PAIN IN FEMALE: ICD-10-CM

## 2020-05-21 DIAGNOSIS — Z01.419 ENCOUNTER FOR WELL WOMAN EXAM WITH ROUTINE GYNECOLOGICAL EXAM: Primary | ICD-10-CM

## 2020-05-21 PROCEDURE — S0610 ANNUAL GYNECOLOGICAL EXAMINA: HCPCS | Performed by: OBSTETRICS & GYNECOLOGY

## 2020-05-21 RX ORDER — CHOLECALCIFEROL (VITAMIN D3) 50 MCG
2000 TABLET ORAL DAILY
COMMUNITY

## 2020-05-21 RX ORDER — TURMERIC ROOT EXTRACT 500 MG
1 TABLET ORAL DAILY
COMMUNITY

## 2020-06-02 ENCOUNTER — HOSPITAL ENCOUNTER (OUTPATIENT)
Dept: ULTRASOUND IMAGING | Facility: HOSPITAL | Age: 60
Discharge: HOME | End: 2020-06-02
Attending: OBSTETRICS & GYNECOLOGY
Payer: COMMERCIAL

## 2020-06-02 DIAGNOSIS — Z01.419 ENCOUNTER FOR WELL WOMAN EXAM WITH ROUTINE GYNECOLOGICAL EXAM: ICD-10-CM

## 2020-06-02 DIAGNOSIS — R10.2 PELVIC PAIN IN FEMALE: ICD-10-CM

## 2020-06-02 PROCEDURE — 76856 US EXAM PELVIC COMPLETE: CPT

## 2020-06-02 PROCEDURE — 76830 TRANSVAGINAL US NON-OB: CPT

## 2020-06-26 ENCOUNTER — HOSPITAL ENCOUNTER (OUTPATIENT)
Dept: RADIOLOGY | Age: 60
Discharge: HOME | End: 2020-06-26
Payer: COMMERCIAL

## 2020-06-26 VITALS — HEIGHT: 65 IN | WEIGHT: 139 LBS | BODY MASS INDEX: 23.16 KG/M2

## 2020-06-26 DIAGNOSIS — Z12.31 ENCOUNTER FOR SCREENING MAMMOGRAM FOR MALIGNANT NEOPLASM OF BREAST: ICD-10-CM

## 2020-06-26 DIAGNOSIS — Z12.39 SCREENING FOR BREAST CANCER: ICD-10-CM

## 2020-06-26 PROCEDURE — 77063 BREAST TOMOSYNTHESIS BI: CPT

## 2020-06-26 PROCEDURE — 77067 SCR MAMMO BI INCL CAD: CPT

## 2020-11-11 ENCOUNTER — OFFICE VISIT (OUTPATIENT)
Dept: URGENT CARE | Age: 60
End: 2020-11-11
Payer: COMMERCIAL

## 2020-11-11 VITALS — HEIGHT: 65 IN | BODY MASS INDEX: 23.32 KG/M2 | WEIGHT: 140 LBS

## 2020-11-11 DIAGNOSIS — Z20.822 EXPOSURE TO COVID-19 VIRUS: Primary | ICD-10-CM

## 2020-11-11 PROCEDURE — 99213 OFFICE O/P EST LOW 20 MIN: CPT | Performed by: PHYSICIAN ASSISTANT

## 2020-11-11 PROCEDURE — U0003 INFECTIOUS AGENT DETECTION BY NUCLEIC ACID (DNA OR RNA); SEVERE ACUTE RESPIRATORY SYNDROME CORONAVIRUS 2 (SARS-COV-2) (CORONAVIRUS DISEASE [COVID-19]), AMPLIFIED PROBE TECHNIQUE, MAKING USE OF HIGH THROUGHPUT TECHNOLOGIES AS DESCRIBED BY CMS-2020-01-R: HCPCS | Performed by: PHYSICIAN ASSISTANT

## 2020-11-13 LAB — SARS-COV-2 RNA SPEC QL NAA+PROBE: NOT DETECTED

## 2020-12-13 ENCOUNTER — NURSE TRIAGE (OUTPATIENT)
Dept: OTHER | Facility: OTHER | Age: 60
End: 2020-12-13

## 2020-12-13 DIAGNOSIS — Z20.822 EXPOSURE TO COVID-19 VIRUS: Primary | ICD-10-CM

## 2020-12-14 DIAGNOSIS — Z20.822 EXPOSURE TO COVID-19 VIRUS: ICD-10-CM

## 2020-12-14 PROCEDURE — U0003 INFECTIOUS AGENT DETECTION BY NUCLEIC ACID (DNA OR RNA); SEVERE ACUTE RESPIRATORY SYNDROME CORONAVIRUS 2 (SARS-COV-2) (CORONAVIRUS DISEASE [COVID-19]), AMPLIFIED PROBE TECHNIQUE, MAKING USE OF HIGH THROUGHPUT TECHNOLOGIES AS DESCRIBED BY CMS-2020-01-R: HCPCS | Performed by: FAMILY MEDICINE

## 2020-12-15 ENCOUNTER — TELEMEDICINE (OUTPATIENT)
Dept: FAMILY MEDICINE CLINIC | Facility: CLINIC | Age: 60
End: 2020-12-15

## 2020-12-15 VITALS — BODY MASS INDEX: 23.3 KG/M2 | HEIGHT: 65 IN

## 2020-12-15 DIAGNOSIS — Z20.822 EXPOSURE TO COVID-19 VIRUS: Primary | ICD-10-CM

## 2020-12-15 LAB — SARS-COV-2 RNA SPEC QL NAA+PROBE: DETECTED

## 2020-12-15 PROCEDURE — 1036F TOBACCO NON-USER: CPT | Performed by: FAMILY MEDICINE

## 2020-12-15 PROCEDURE — 99213 OFFICE O/P EST LOW 20 MIN: CPT | Performed by: FAMILY MEDICINE

## 2021-01-04 ENCOUNTER — TELEMEDICINE (OUTPATIENT)
Dept: FAMILY MEDICINE CLINIC | Facility: CLINIC | Age: 61
End: 2021-01-04

## 2021-01-04 DIAGNOSIS — U07.1 COVID-19 VIRUS INFECTION: Primary | ICD-10-CM

## 2021-01-04 PROCEDURE — 99212 OFFICE O/P EST SF 10 MIN: CPT | Performed by: FAMILY MEDICINE

## 2021-01-04 NOTE — PROGRESS NOTES
COVID-19 Virtual Visit     Assessment/Plan:    Problem List Items Addressed This Visit        Other    COVID-19 virus infection - Primary     Resolved  · Symptoms onset 12/8/2020  · Tested positive 12/15  · Patient admits to complete resolution of symptoms  Only occasional dry cough  · Patient is requesting note to return to work  · As patient has resolved symptoms, fever free for >3 days, and is outside of the 10 day quarantine since symptom onset, she may return to work without restriction  · I encourage patient to continue to wear a mask and social distance  · Follow up prn              Disposition:     I recommended continued isolation until at least 24 hours have passed since recovery defined as resolution of fever without the use of fever-reducing medications and improvement in respiratory symptoms (e g , cough, shortness of breath) AND 10 days have passed since onset of symptoms  I have spent 16 minutes directly with the patient  Greater than 50% of this time was spent in counseling/coordination of care regarding: prognosis, instructions for management, patient and family education and impressions  Encounter provider Andres Coronado DO    Provider located at [address] [address] [phone]    Recent Visits  No visits were found meeting these conditions  Showing recent visits within past 7 days and meeting all other requirements     Today's Visits  Date Type Provider Dept   01/04/21 [facility]medicine [facility] today's visits and meeting all other requirements     Future Appointments  No visits were found meeting these conditions  Showing future appointments within next 150 days and meeting all other requirements      This virtual check-in was done via Microsoft Teams and patient was informed that this is a secure, HIPAA-compliant platform  She agrees to proceed      Patient agrees to participate in a virtual check in via telephone or video visit instead of presenting to the office to address urgent/immediate medical needs  Patient is aware this is a billable service  After connecting through [facility], the patient was identified by name and date of birth  Samreen Meléndez was informed that this was a telemedicine visit and that the exam was being conducted confidentially over secure lines  My office door was closed  No one else was in the room  Samreen Meléndez acknowledged consent and understanding of privacy and security of the telemedicine visit  I informed the patient that I have reviewed her record in Epic and presented the opportunity for her to ask any questions regarding the visit today  The patient agreed to participate  Subjective:   Samreen Meléndez is a 62 y o  female who has been screened for COVID-19  Symptom change since last report: resolving  Patient is asymptomatic  Date of symptom onset: 12/8/2020    Patient denies fever, chills, fatigue, malaise, congestion, rhinorrhea, sore throat, anosmia, loss of taste, cough, shortness of breath, chest tightness, abdominal pain, nausea, vomiting, diarrhea, myalgias and headaches  Smiley Bales has been staying home and has isolated themselves in her home  She is taking care to not share personal items and is cleaning all surfaces that are touched often, like counters, tabletops, and doorknobs using household cleaning sprays or wipes  She is wearing a mask when she leaves her room       Lab Results   Component Value Date    SARSCOV2 Detected (A) 12/14/2020     Past Medical History:   Diagnosis Date    Anxiety disorder     Last Assessed:11/12/2013    Hematuria     Last Assessed:12/10/2012    Lyme disease     Last Assessed:12/10/2012    Migraine     Last Assessed:3/20/2014    Vitamin D deficiency     Last Assessed:3/20/2014     Past Surgical History:   Procedure Laterality Date    CHOLECYSTECTOMY      MANDIBLE FRACTURE SURGERY      MANDIBLE SURGERY      wired after broken    TUBAL LIGATION      WISDOM TOOTH EXTRACTION      WRIST SURGERY       Current Outpatient Medications   Medication Sig Dispense Refill    Cholecalciferol (VITAMIN D) 50 MCG (2000 UT) tablet Take 2,000 Units by mouth daily      Multiple Vitamins-Minerals (MULTIVITAMIN ADULT PO) Take 1 tablet by mouth daily      Turmeric 500 MG TABS Take 1 tablet by mouth daily       No current facility-administered medications for this visit  Allergies   Allergen Reactions    Propranolol Fatigue    Augmentin [Amoxicillin-Pot Clavulanate] Rash    Topiramate Other (See Comments)     depression       Review of Systems   Constitutional: Negative for chills, fatigue and fever  HENT: Negative for congestion, rhinorrhea and sore throat  Respiratory: Negative for cough, chest tightness and shortness of breath  Gastrointestinal: Negative for abdominal pain, diarrhea, nausea and vomiting  Musculoskeletal: Negative for myalgias  Neurological: Negative for headaches  Objective: There were no vitals filed for this visit  Physical Exam  Constitutional:       General: She is not in acute distress  Appearance: Normal appearance  She is not ill-appearing, toxic-appearing or diaphoretic  HENT:      Head: Normocephalic and atraumatic  Nose: Nose normal  No congestion  Mouth/Throat:      Mouth: Mucous membranes are moist    Eyes:      General: No scleral icterus  Right eye: No discharge  Left eye: No discharge  Pulmonary:      Effort: No respiratory distress  Skin:     Coloration: Skin is not pale  Findings: No erythema or rash  Neurological:      Mental Status: She is alert and oriented to person, place, and time  Mental status is at baseline  Psychiatric:         Mood and Affect: Mood normal          Behavior: Behavior normal          Thought Content:  Thought content normal          Judgment: Judgment normal  VIRTUAL VISIT DISCLAIMER    Chris Meraz acknowledges that she has consented to an online visit or consultation  She understands that the online visit is based solely on information provided by her, and that, in the absence of a face-to-face physical evaluation by the physician, the diagnosis she receives is both limited and provisional in terms of accuracy and completeness  This is not intended to replace a full medical face-to-face evaluation by the physician  Charlotte Dietrich understands and accepts these terms

## 2021-01-04 NOTE — LETTER
January 4, 2021     Patient: Charlie Cates   YOB: 1960   Date of Visit: 1/4/2021       To Whom it May Concern:    Coty Vaughn was seen in my clinic on 1/4/2021  She may return to work on 1/4/2021  If you have any questions or concerns, please don't hesitate to call           Sincerely,          Lm Velazquez DO        CC: No Recipients

## 2021-01-04 NOTE — ASSESSMENT & PLAN NOTE
Resolved  · Symptoms onset 12/8/2020  · Tested positive 12/15  · Patient admits to complete resolution of symptoms   Only occasional dry cough  · Patient is requesting note to return to work  · As patient has resolved symptoms, fever free for >3 days, and is outside of the 10 day quarantine since symptom onset, she may return to work without restriction  · I encourage patient to continue to wear a mask and social distance  · Follow up prn

## 2021-12-20 ENCOUNTER — OFFICE VISIT (OUTPATIENT)
Dept: FAMILY MEDICINE CLINIC | Facility: CLINIC | Age: 61
End: 2021-12-20

## 2021-12-20 VITALS
HEIGHT: 65 IN | RESPIRATION RATE: 18 BRPM | BODY MASS INDEX: 23.36 KG/M2 | SYSTOLIC BLOOD PRESSURE: 118 MMHG | TEMPERATURE: 97.8 F | DIASTOLIC BLOOD PRESSURE: 70 MMHG | OXYGEN SATURATION: 98 % | HEART RATE: 78 BPM | WEIGHT: 140.2 LBS

## 2021-12-20 DIAGNOSIS — Z11.4 SCREENING FOR HIV (HUMAN IMMUNODEFICIENCY VIRUS): ICD-10-CM

## 2021-12-20 DIAGNOSIS — Z00.00 ANNUAL PHYSICAL EXAM: Primary | ICD-10-CM

## 2021-12-20 DIAGNOSIS — Z12.12 SCREENING FOR COLORECTAL CANCER: ICD-10-CM

## 2021-12-20 DIAGNOSIS — Z11.59 ENCOUNTER FOR HEPATITIS C SCREENING TEST FOR LOW RISK PATIENT: ICD-10-CM

## 2021-12-20 DIAGNOSIS — Z12.31 ENCOUNTER FOR SCREENING MAMMOGRAM FOR MALIGNANT NEOPLASM OF BREAST: ICD-10-CM

## 2021-12-20 DIAGNOSIS — Z12.11 SCREENING FOR COLORECTAL CANCER: ICD-10-CM

## 2021-12-20 DIAGNOSIS — Z13.6 SCREENING FOR CARDIOVASCULAR CONDITION: ICD-10-CM

## 2021-12-20 PROCEDURE — 1036F TOBACCO NON-USER: CPT | Performed by: FAMILY MEDICINE

## 2021-12-20 PROCEDURE — 99396 PREV VISIT EST AGE 40-64: CPT | Performed by: FAMILY MEDICINE

## 2021-12-20 PROCEDURE — 3008F BODY MASS INDEX DOCD: CPT | Performed by: FAMILY MEDICINE

## 2021-12-20 PROCEDURE — 3725F SCREEN DEPRESSION PERFORMED: CPT | Performed by: FAMILY MEDICINE

## 2022-01-22 ENCOUNTER — APPOINTMENT (OUTPATIENT)
Dept: LAB | Facility: CLINIC | Age: 62
End: 2022-01-22
Payer: COMMERCIAL

## 2022-01-22 DIAGNOSIS — Z13.6 SCREENING FOR CARDIOVASCULAR CONDITION: ICD-10-CM

## 2022-01-22 DIAGNOSIS — Z11.4 SCREENING FOR HIV (HUMAN IMMUNODEFICIENCY VIRUS): ICD-10-CM

## 2022-01-22 DIAGNOSIS — Z11.59 ENCOUNTER FOR HEPATITIS C SCREENING TEST FOR LOW RISK PATIENT: ICD-10-CM

## 2022-01-22 LAB
ALBUMIN SERPL BCP-MCNC: 3.7 G/DL (ref 3.5–5)
ALP SERPL-CCNC: 73 U/L (ref 46–116)
ALT SERPL W P-5'-P-CCNC: 29 U/L (ref 12–78)
ANION GAP SERPL CALCULATED.3IONS-SCNC: 4 MMOL/L (ref 4–13)
AST SERPL W P-5'-P-CCNC: 13 U/L (ref 5–45)
BASOPHILS # BLD AUTO: 0.04 THOUSANDS/ΜL (ref 0–0.1)
BASOPHILS NFR BLD AUTO: 1 % (ref 0–1)
BILIRUB SERPL-MCNC: 1.27 MG/DL (ref 0.2–1)
BUN SERPL-MCNC: 14 MG/DL (ref 5–25)
CALCIUM SERPL-MCNC: 9 MG/DL (ref 8.3–10.1)
CHLORIDE SERPL-SCNC: 105 MMOL/L (ref 100–108)
CHOLEST SERPL-MCNC: 208 MG/DL
CO2 SERPL-SCNC: 32 MMOL/L (ref 21–32)
CREAT SERPL-MCNC: 0.72 MG/DL (ref 0.6–1.3)
EOSINOPHIL # BLD AUTO: 0.08 THOUSAND/ΜL (ref 0–0.61)
EOSINOPHIL NFR BLD AUTO: 1 % (ref 0–6)
ERYTHROCYTE [DISTWIDTH] IN BLOOD BY AUTOMATED COUNT: 13.4 % (ref 11.6–15.1)
GFR SERPL CREATININE-BSD FRML MDRD: 91 ML/MIN/1.73SQ M
GLUCOSE P FAST SERPL-MCNC: 101 MG/DL (ref 65–99)
HCT VFR BLD AUTO: 42.3 % (ref 34.8–46.1)
HCV AB SER QL: NORMAL
HDLC SERPL-MCNC: 50 MG/DL
HGB BLD-MCNC: 13.3 G/DL (ref 11.5–15.4)
IMM GRANULOCYTES # BLD AUTO: 0.02 THOUSAND/UL (ref 0–0.2)
IMM GRANULOCYTES NFR BLD AUTO: 0 % (ref 0–2)
LDLC SERPL CALC-MCNC: 141 MG/DL (ref 0–100)
LYMPHOCYTES # BLD AUTO: 2.67 THOUSANDS/ΜL (ref 0.6–4.47)
LYMPHOCYTES NFR BLD AUTO: 40 % (ref 14–44)
MCH RBC QN AUTO: 29.6 PG (ref 26.8–34.3)
MCHC RBC AUTO-ENTMCNC: 31.4 G/DL (ref 31.4–37.4)
MCV RBC AUTO: 94 FL (ref 82–98)
MONOCYTES # BLD AUTO: 0.5 THOUSAND/ΜL (ref 0.17–1.22)
MONOCYTES NFR BLD AUTO: 7 % (ref 4–12)
NEUTROPHILS # BLD AUTO: 3.41 THOUSANDS/ΜL (ref 1.85–7.62)
NEUTS SEG NFR BLD AUTO: 51 % (ref 43–75)
NONHDLC SERPL-MCNC: 158 MG/DL
NRBC BLD AUTO-RTO: 0 /100 WBCS
PLATELET # BLD AUTO: 240 THOUSANDS/UL (ref 149–390)
PMV BLD AUTO: 10.6 FL (ref 8.9–12.7)
POTASSIUM SERPL-SCNC: 4.4 MMOL/L (ref 3.5–5.3)
PROT SERPL-MCNC: 6.9 G/DL (ref 6.4–8.2)
RBC # BLD AUTO: 4.5 MILLION/UL (ref 3.81–5.12)
SODIUM SERPL-SCNC: 141 MMOL/L (ref 136–145)
TRIGL SERPL-MCNC: 84 MG/DL
WBC # BLD AUTO: 6.72 THOUSAND/UL (ref 4.31–10.16)

## 2022-01-22 PROCEDURE — 80061 LIPID PANEL: CPT

## 2022-01-22 PROCEDURE — 36415 COLL VENOUS BLD VENIPUNCTURE: CPT

## 2022-01-22 PROCEDURE — 86803 HEPATITIS C AB TEST: CPT

## 2022-01-22 PROCEDURE — 80053 COMPREHEN METABOLIC PANEL: CPT

## 2022-01-22 PROCEDURE — 85025 COMPLETE CBC W/AUTO DIFF WBC: CPT

## 2022-01-22 PROCEDURE — 87389 HIV-1 AG W/HIV-1&-2 AB AG IA: CPT

## 2022-01-24 LAB — HIV 1+2 AB+HIV1 P24 AG SERPL QL IA: NORMAL

## 2022-08-10 ENCOUNTER — NEW PATIENT COMPREHENSIVE (OUTPATIENT)
Dept: URBAN - METROPOLITAN AREA CLINIC 6 | Facility: CLINIC | Age: 62
End: 2022-08-10

## 2022-08-10 DIAGNOSIS — H25.13: ICD-10-CM

## 2022-08-10 DIAGNOSIS — H43.811: ICD-10-CM

## 2022-08-10 PROCEDURE — 92004 COMPRE OPH EXAM NEW PT 1/>: CPT

## 2022-08-10 PROCEDURE — 92020 GONIOSCOPY: CPT

## 2022-08-10 ASSESSMENT — KERATOMETRY
OS_AXISANGLE2_DEGREES: 164
OD_K1POWER_DIOPTERS: 42.25
OD_AXISANGLE_DEGREES: 116
OS_K1POWER_DIOPTERS: 42.25
OS_AXISANGLE_DEGREES: 74
OD_K2POWER_DIOPTERS: 42.50
OS_K2POWER_DIOPTERS: 43.00
OD_AXISANGLE2_DEGREES: 26

## 2022-08-10 ASSESSMENT — VISUAL ACUITY
OU_CC: J1
OS_PH: 20/25
OD_CC: 20/40+1
OS_CC: 20/30
OD_PH: 20/25

## 2022-08-10 ASSESSMENT — TONOMETRY
OD_IOP_MMHG: 8
OS_IOP_MMHG: 8

## 2022-08-31 ENCOUNTER — FOLLOW UP (OUTPATIENT)
Dept: URBAN - METROPOLITAN AREA CLINIC 6 | Facility: CLINIC | Age: 62
End: 2022-08-31

## 2022-08-31 DIAGNOSIS — H43.811: ICD-10-CM

## 2022-08-31 DIAGNOSIS — H25.13: ICD-10-CM

## 2022-08-31 PROCEDURE — 92012 INTRM OPH EXAM EST PATIENT: CPT

## 2022-08-31 ASSESSMENT — KERATOMETRY
OS_K1POWER_DIOPTERS: 42.25
OS_K2POWER_DIOPTERS: 43.00
OS_AXISANGLE2_DEGREES: 164
OD_K2POWER_DIOPTERS: 42.50
OD_K1POWER_DIOPTERS: 42.25
OS_AXISANGLE_DEGREES: 74
OD_AXISANGLE2_DEGREES: 26
OD_AXISANGLE_DEGREES: 116

## 2022-08-31 ASSESSMENT — TONOMETRY
OS_IOP_MMHG: 15
OD_IOP_MMHG: 13

## 2022-08-31 ASSESSMENT — VISUAL ACUITY
OD_CC: 20/25
OS_CC: 20/30

## 2022-10-18 ENCOUNTER — TELEPHONE (OUTPATIENT)
Dept: OTHER | Facility: OTHER | Age: 62
End: 2022-10-18

## 2022-11-16 ENCOUNTER — CONSULT (OUTPATIENT)
Dept: GASTROENTEROLOGY | Facility: CLINIC | Age: 62
End: 2022-11-16

## 2022-11-16 VITALS
SYSTOLIC BLOOD PRESSURE: 118 MMHG | BODY MASS INDEX: 23.82 KG/M2 | DIASTOLIC BLOOD PRESSURE: 70 MMHG | WEIGHT: 143 LBS | TEMPERATURE: 98.6 F | HEIGHT: 65 IN | HEART RATE: 77 BPM

## 2022-11-16 DIAGNOSIS — Z12.12 SCREENING FOR COLORECTAL CANCER: ICD-10-CM

## 2022-11-16 DIAGNOSIS — Z12.11 SCREENING FOR COLORECTAL CANCER: ICD-10-CM

## 2022-11-16 DIAGNOSIS — R17 ELEVATED BILIRUBIN: Primary | ICD-10-CM

## 2022-11-16 NOTE — PATIENT INSTRUCTIONS
Scheduled date of colonoscopy (as of today):  1/16/23  Physician performing colonoscopy: Dr Vani De La Garza  Location of colonoscopy: [facility]  Bowel prep reviewed with patient: lizett/dulcolax  Instructions reviewed with patient by: Gordon  Clearances:   n/a

## 2022-11-16 NOTE — PROGRESS NOTES
[facility] - Outpatient Consultation  Penelope Meraz 65 y o  female MRN: [id number]  Encounter: [id number]      PCP: Roxane Boeck, MD  Referring: Roxane Boeck, MD [place], [address]      ASSESSMENT AND PLAN:    65 yr old F w/ no significant PMH who was referred to GI clinic for colon cancer screening  1  Screening for colorectal cancer    · Previous colonoscopy was in 25s but has not had any screening colonoscopies   · Patient is not on any antiplatelets or anticoagulants  · No family history of colon cancer  · Will help schedule for colonoscopy  · No changes in bowel movements or weight loss    2  Elevated bilirubin    · Patient had an elevated bilirubin of 1 27, rest of labs normal  Could be related to Gilbert's but will fractionate with next set of labs   · Bilirubin in 2016 was wnl     Patient discussed with Dr Donny Kern MD   Gastroenterology Fellow         ______________________________________________________________________    CC:  Chief Complaint   Patient presents with   • New Patient Visit   • Screening Colonoscopy       HPI:  65 yr old F w/ no significant PMH who was referred to GI clinic for colon cancer screening  She states she had a colonoscopy in her 25s which was done due to abdominal pain  She denies seeing any blood in her stool or changes in bowel habits  REVIEW OF SYSTEMS:    CONSTITUTIONAL: Denies any fever, chills, rigors, and weight loss  HEENT: No earache or tinnitus  Denies hearing loss or visual disturbances  CARDIOVASCULAR: No chest pain or palpitations  RESPIRATORY: Denies any cough, hemoptysis, shortness of breath or dyspnea on exertion  GASTROINTESTINAL: As noted in the History of Present Illness  GENITOURINARY: No problems with urination  Denies any hematuria or dysuria  NEUROLOGIC: No dizziness or vertigo, denies headaches  MUSCULOSKELETAL: Denies any muscle or joint pain     SKIN: Denies skin rashes or itching  ENDOCRINE: Denies excessive thirst  Denies intolerance to heat or cold  PSYCHOSOCIAL: Denies depression or anxiety  Denies any recent memory loss         Historical Information   Past Medical History:   Diagnosis Date   • Anxiety disorder     Last Assessed:11/12/2013   • Hematuria     Last Assessed:12/10/2012   • Lyme disease     Last Assessed:12/10/2012   • Migraine     Last Assessed:3/20/2014   • Vitamin D deficiency     Last Assessed:3/20/2014     Past Surgical History:   Procedure Laterality Date   • CHOLECYSTECTOMY     • MANDIBLE FRACTURE SURGERY     • MANDIBLE SURGERY      wired after broken   • TUBAL LIGATION     • WISDOM TOOTH EXTRACTION     • WRIST SURGERY       Social History   Social History     Substance and Sexual Activity   Alcohol Use Yes   • Alcohol/week: 1 0 standard drink   • Types: 1 Glasses of wine per week    Comment: monthly ; social     Social History     Substance and Sexual Activity   Drug Use No     Social History     Tobacco Use   Smoking Status Never   Smokeless Tobacco Never     Family History   Problem Relation Age of Onset   • Breast cancer Mother 68   • Hypothyroidism Mother    • Other Sister         renal disease   • Prostate cancer Brother    • Diabetes Maternal Grandmother    • Diabetes Maternal Grandfather    • Diabetes Paternal Grandmother    • Hypertension Paternal Grandmother    • Diabetes Paternal Grandfather    • No Known Problems Father    • No Known Problems Daughter    • Leukemia Maternal Aunt    • No Known Problems Paternal Aunt        Meds/Allergies       Current Outpatient Medications:   •  Cholecalciferol (VITAMIN D) 50 MCG (2000 UT) tablet  •  Multiple Vitamins-Minerals (MULTIVITAMIN ADULT PO)  •  Turmeric 500 MG TABS    Allergies   Allergen Reactions   • Propranolol Fatigue   • Augmentin [Amoxicillin-Pot Clavulanate] Rash   • Topiramate Other (See Comments)     depression           Objective     Blood pressure 118/70, pulse 77, temperature 98 6 °F (37 °C), temperature source Tympanic, height 5' 5" (1 651 m), weight 64 9 kg (143 lb)  Body mass index is 23 8 kg/m²  PHYSICAL EXAM:      General Appearance:   Alert, cooperative, no distress   HEENT:   Normocephalic, atraumatic, anicteric  Neck:  Supple, symmetrical, trachea midline   Lungs:   Clear to auscultation bilaterally; no rales, rhonchi or wheezing; respirations unlabored    Heart[de-identified]   Regular rate and rhythm; no murmur, rub, or gallop  Abdomen:   Soft, non-tender, non-distended; normal bowel sounds; no masses, no organomegaly    Genitalia:   Deferred    Rectal:   Deferred    Extremities:  No cyanosis, clubbing or edema    Pulses:  2+ and symmetric    Skin:  No jaundice, rashes, or lesions    Lymph nodes:  No palpable cervical lymphadenopathy        Lab Results:     Lab Results   Component Value Date    WBC 6 72 01/22/2022    HGB 13 3 01/22/2022    HCT 42 3 01/22/2022    MCV 94 01/22/2022     01/22/2022       Lab Results   Component Value Date     07/02/2016    K 4 4 01/22/2022     01/22/2022    CO2 32 01/22/2022    BUN 14 01/22/2022    CREATININE 0 72 01/22/2022    GLUF 101 (H) 01/22/2022    CALCIUM 9 0 01/22/2022    AST 13 01/22/2022    ALT 29 01/22/2022    ALKPHOS 73 01/22/2022    PROT 6 5 07/02/2016    BILITOT 0 5 07/02/2016    EGFR 91 01/22/2022       No results found for: INR, PROTIME      Radiology Results:   No results found  Portions of the record may have been created with voice recognition software  Occasional wrong word or "sound a like" substitutions may have occurred due to the inherent limitations of voice recognition software  Read the chart carefully and recognize, using context, where substitutions have occurred

## 2022-11-30 ENCOUNTER — HOSPITAL ENCOUNTER (OUTPATIENT)
Dept: RADIOLOGY | Age: 62
Discharge: HOME | End: 2022-11-30

## 2022-11-30 VITALS — WEIGHT: 142 LBS | HEIGHT: 65 IN | BODY MASS INDEX: 23.66 KG/M2

## 2022-11-30 DIAGNOSIS — Z12.31 SCREENING MAMMOGRAM, ENCOUNTER FOR: ICD-10-CM

## 2022-12-22 ENCOUNTER — OFFICE VISIT (OUTPATIENT)
Dept: FAMILY MEDICINE CLINIC | Facility: CLINIC | Age: 62
End: 2022-12-22

## 2022-12-22 VITALS
RESPIRATION RATE: 18 BRPM | HEART RATE: 66 BPM | SYSTOLIC BLOOD PRESSURE: 107 MMHG | WEIGHT: 147.8 LBS | DIASTOLIC BLOOD PRESSURE: 69 MMHG | HEIGHT: 66 IN | BODY MASS INDEX: 23.75 KG/M2

## 2022-12-22 DIAGNOSIS — Z00.00 ANNUAL PHYSICAL EXAM: Primary | ICD-10-CM

## 2022-12-22 NOTE — PATIENT INSTRUCTIONS

## 2022-12-22 NOTE — ASSESSMENT & PLAN NOTE
- Screening for cardiovascular disease: yearly BMP and Lipid panel ordered today to be done in a month from now     - Screening for colorectal cancer: Referral given to GI , colonoscopy scheduled for January  - Screening for cervical cancer: pap 3 years ago, normal  - Screening for breast cancer: Mammogram done 11/2022, normal, will f/u next year   - Vaccinations: deferred flu, COVID vaccine  - Depression screening: neg  - Counseled the patient on healthy lifestyle habits and increase physical activity

## 2022-12-22 NOTE — PROGRESS NOTES
[facility]    NAME: Mariely Meraz  AGE: 65 y o  SEX: female  : 1960      DATE: 2022     Assessment and Plan:     Problem List Items Addressed This Visit        Other    Annual physical exam - Primary     - Screening for cardiovascular disease: yearly BMP and Lipid panel ordered today to be done in a month from now  - Screening for colorectal cancer: Referral given to GI , colonoscopy scheduled for January  - Screening for cervical cancer: pap 3 years ago, normal  - Screening for breast cancer: Mammogram done 2022, normal, will f/u next year   - Vaccinations: deferred flu, COVID vaccine  - Depression screening: neg  - Counseled the patient on healthy lifestyle habits and increase physical activity           Relevant Orders    Comprehensive metabolic panel    Lipid panel       Immunizations and preventive care screenings were discussed with patient today  Appropriate education was printed on patient's after visit summary  Counseling:  Alcohol/drug use: discussed moderation in alcohol intake, the recommendations for healthy alcohol use, and avoidance of illicit drug use  Dental Health: discussed importance of regular tooth brushing, flossing, and dental visits  Injury prevention: discussed safety/seat belts, safety helmets, smoke detectors, carbon dioxide detectors, and smoking near bedding or upholstery  · Exercise: the importance of regular exercise/physical activity was discussed  Recommend exercise 3-5 times per week for at least 30 minutes  Return in about 1 year (around 2023), or if symptoms worsen or fail to improve, for Annual physical      Chief Complaint:     Chief Complaint   Patient presents with   • Physical Exam      History of Present Illness:     Adult Annual Physical   Patient here for a comprehensive physical exam  The patient reports no problems      Diet and Physical Activity  · Diet/Nutrition: well balanced diet  · Exercise: no formal exercise  Depression Screening  PHQ-2/9 Depression Screening    Little interest or pleasure in doing things: 0 - not at all  Feeling down, depressed, or hopeless: 0 - not at all  PHQ-2 Score: 0  PHQ-2 Interpretation: Negative depression screen       General Health  · Sleep: sleeps well  · Hearing: normal - bilateral   · Vision: most recent eye exam >1 year ago and wears glasses  · Dental: regular dental visits  /GYN Health  · Patient is: postmenopausal  · Contraceptive method: none  Review of Systems:     Review of Systems   Constitutional: Negative for chills and fever  HENT: Negative for ear pain and sore throat  Eyes: Negative for pain and visual disturbance  Respiratory: Negative for cough and shortness of breath  Cardiovascular: Negative for chest pain and palpitations  Gastrointestinal: Negative  Negative for abdominal pain and vomiting  Genitourinary: Negative  Musculoskeletal: Negative  Neurological: Negative for syncope and headaches  All other systems reviewed and are negative       Past Medical History:     Past Medical History:   Diagnosis Date   • Anxiety disorder     Last Assessed:11/12/2013   • Hematuria     Last Assessed:12/10/2012   • Lyme disease     Last Assessed:12/10/2012   • Migraine     Last Assessed:3/20/2014   • Vitamin D deficiency     Last Assessed:3/20/2014      Past Surgical History:     Past Surgical History:   Procedure Laterality Date   • CHOLECYSTECTOMY     • MANDIBLE FRACTURE SURGERY     • MANDIBLE SURGERY      wired after broken   • TUBAL LIGATION     • WISDOM TOOTH EXTRACTION     • WRIST SURGERY        Social History:     Social History     Socioeconomic History   • Marital status: /Civil Union     Spouse name: None   • Number of children: None   • Years of education: None   • Highest education level: None   Occupational History   • None   Tobacco Use • Smoking status: Never   • Smokeless tobacco: Never   Vaping Use   • Vaping Use: Never used   Substance and Sexual Activity   • Alcohol use: Yes     Alcohol/week: 1 0 standard drink     Types: 1 Glasses of wine per week     Comment: monthly ; social   • Drug use: No   • Sexual activity: Not Currently     Partners: Male     Birth control/protection: Post-menopausal   Other Topics Concern   • None   Social History Narrative   • None     Social Determinants of Health     Financial Resource Strain: Low Risk    • Difficulty of Paying Living Expenses: Not hard at all   Food Insecurity: No Food Insecurity   • Worried About Running Out of Food in the Last Year: Never true   • Ran Out of Food in the Last Year: Never true   Transportation Needs: No Transportation Needs   • Lack of Transportation (Medical): No   • Lack of Transportation (Non-Medical):  No   Physical Activity: Not on file   Stress: No Stress Concern Present   • Feeling of Stress : Not at all   Social Connections: Not on file   Intimate Partner Violence: Not At Risk   • Fear of Current or Ex-Partner: No   • Emotionally Abused: No   • Physically Abused: No   • Sexually Abused: No   Housing Stability: Low Risk    • Unable to Pay for Housing in the Last Year: No   • Number of Places Lived in the Last Year: 1   • Unstable Housing in the Last Year: No      Family History:     Family History   Problem Relation Age of Onset   • Breast cancer Mother 68   • Hypothyroidism Mother    • Other Sister         renal disease   • Prostate cancer Brother    • Diabetes Maternal Grandmother    • Diabetes Maternal Grandfather    • Diabetes Paternal Grandmother    • Hypertension Paternal Grandmother    • Diabetes Paternal Grandfather    • No Known Problems Father    • No Known Problems Daughter    • Leukemia Maternal Aunt    • No Known Problems Paternal Aunt       Current Medications:     Current Outpatient Medications   Medication Sig Dispense Refill   • Cholecalciferol (VITAMIN D) 50 MCG (2000 UT) tablet Take 2,000 Units by mouth daily     • Multiple Vitamins-Minerals (MULTIVITAMIN ADULT PO) Take 1 tablet by mouth daily     • Turmeric 500 MG TABS Take 1 tablet by mouth daily     • polyethylene glycol (GOLYTELY) 4000 mL solution Take 4,000 mL by mouth once for 1 dose 4000 mL 0     No current facility-administered medications for this visit  Allergies: Allergies   Allergen Reactions   • Propranolol Fatigue   • Augmentin [Amoxicillin-Pot Clavulanate] Rash   • Topiramate Other (See Comments)     depression      Physical Exam:     /69   Pulse 66   Resp 18   Ht 5' 6 4" (1 687 m)   Wt 67 kg (147 lb 12 8 oz)   BMI 23 57 kg/m²     Physical Exam  Vitals and nursing note reviewed  Constitutional:       General: She is not in acute distress  Appearance: She is well-developed  HENT:      Head: Normocephalic and atraumatic  Mouth/Throat:      Mouth: Mucous membranes are moist    Eyes:      Conjunctiva/sclera: Conjunctivae normal    Cardiovascular:      Rate and Rhythm: Normal rate and regular rhythm  Heart sounds: No murmur heard  Pulmonary:      Effort: Pulmonary effort is normal  No respiratory distress  Breath sounds: Normal breath sounds  Abdominal:      Palpations: Abdomen is soft  Tenderness: There is no abdominal tenderness  Musculoskeletal:         General: No swelling  Cervical back: Neck supple  Skin:     General: Skin is warm and dry  Capillary Refill: Capillary refill takes less than 2 seconds  Neurological:      Mental Status: She is alert     Psychiatric:         Mood and Affect: Mood normal           Kesha Castillo MD  [address]

## 2023-01-10 ENCOUNTER — APPOINTMENT (OUTPATIENT)
Dept: LAB | Facility: CLINIC | Age: 63
End: 2023-01-10

## 2023-01-10 DIAGNOSIS — Z00.00 ANNUAL PHYSICAL EXAM: ICD-10-CM

## 2023-01-10 DIAGNOSIS — R17 ELEVATED BILIRUBIN: ICD-10-CM

## 2023-01-10 LAB
ALBUMIN SERPL BCP-MCNC: 3.6 G/DL (ref 3.5–5)
ALP SERPL-CCNC: 62 U/L (ref 46–116)
ALT SERPL W P-5'-P-CCNC: 23 U/L (ref 12–78)
ANION GAP SERPL CALCULATED.3IONS-SCNC: 4 MMOL/L (ref 4–13)
AST SERPL W P-5'-P-CCNC: 11 U/L (ref 5–45)
BILIRUB DIRECT SERPL-MCNC: <0.05 MG/DL (ref 0–0.2)
BILIRUB SERPL-MCNC: 0.36 MG/DL (ref 0.2–1)
BUN SERPL-MCNC: 14 MG/DL (ref 5–25)
CALCIUM SERPL-MCNC: 8.8 MG/DL (ref 8.3–10.1)
CHLORIDE SERPL-SCNC: 106 MMOL/L (ref 96–108)
CHOLEST SERPL-MCNC: 184 MG/DL
CO2 SERPL-SCNC: 31 MMOL/L (ref 21–32)
CREAT SERPL-MCNC: 0.78 MG/DL (ref 0.6–1.3)
GFR SERPL CREATININE-BSD FRML MDRD: 82 ML/MIN/1.73SQ M
GLUCOSE P FAST SERPL-MCNC: 102 MG/DL (ref 65–99)
HDLC SERPL-MCNC: 48 MG/DL
LDLC SERPL CALC-MCNC: 118 MG/DL (ref 0–100)
NONHDLC SERPL-MCNC: 136 MG/DL
POTASSIUM SERPL-SCNC: 4.4 MMOL/L (ref 3.5–5.3)
PROT SERPL-MCNC: 7 G/DL (ref 6.4–8.4)
SODIUM SERPL-SCNC: 141 MMOL/L (ref 135–147)
TRIGL SERPL-MCNC: 88 MG/DL

## 2023-01-16 ENCOUNTER — ANESTHESIA (OUTPATIENT)
Dept: GASTROENTEROLOGY | Facility: AMBULARY SURGERY CENTER | Age: 63
End: 2023-01-16

## 2023-01-16 ENCOUNTER — ANESTHESIA EVENT (OUTPATIENT)
Dept: GASTROENTEROLOGY | Facility: AMBULARY SURGERY CENTER | Age: 63
End: 2023-01-16

## 2023-01-16 ENCOUNTER — HOSPITAL ENCOUNTER (OUTPATIENT)
Dept: GASTROENTEROLOGY | Facility: AMBULARY SURGERY CENTER | Age: 63
Setting detail: OUTPATIENT SURGERY
Discharge: HOME | End: 2023-01-16

## 2023-01-16 VITALS
DIASTOLIC BLOOD PRESSURE: 60 MMHG | SYSTOLIC BLOOD PRESSURE: 121 MMHG | TEMPERATURE: 97.2 F | OXYGEN SATURATION: 100 % | HEIGHT: 65 IN | HEART RATE: 59 BPM | BODY MASS INDEX: 23.16 KG/M2 | WEIGHT: 139 LBS | RESPIRATION RATE: 18 BRPM

## 2023-01-16 DIAGNOSIS — Z12.12 SCREENING FOR COLORECTAL CANCER: ICD-10-CM

## 2023-01-16 DIAGNOSIS — Z12.11 SCREENING FOR COLORECTAL CANCER: ICD-10-CM

## 2023-01-16 RX ORDER — PROPOFOL 10 MG/ML
INJECTION, EMULSION INTRAVENOUS AS NEEDED
Status: DISCONTINUED | OUTPATIENT
Start: 2023-01-16 | End: 2023-01-16

## 2023-01-16 RX ORDER — LIDOCAINE HYDROCHLORIDE 10 MG/ML
INJECTION, SOLUTION EPIDURAL; INFILTRATION; INTRACAUDAL; PERINEURAL AS NEEDED
Status: DISCONTINUED | OUTPATIENT
Start: 2023-01-16 | End: 2023-01-16

## 2023-01-16 RX ORDER — SODIUM CHLORIDE, SODIUM LACTATE, POTASSIUM CHLORIDE, CALCIUM CHLORIDE 600; 310; 30; 20 MG/100ML; MG/100ML; MG/100ML; MG/100ML
INJECTION, SOLUTION INTRAVENOUS CONTINUOUS PRN
Status: DISCONTINUED | OUTPATIENT
Start: 2023-01-16 | End: 2023-01-16

## 2023-01-16 RX ADMIN — PROPOFOL 20 MG: 10 INJECTION, EMULSION INTRAVENOUS at 10:48

## 2023-01-16 RX ADMIN — PROPOFOL 20 MG: 10 INJECTION, EMULSION INTRAVENOUS at 10:42

## 2023-01-16 RX ADMIN — PROPOFOL 20 MG: 10 INJECTION, EMULSION INTRAVENOUS at 10:46

## 2023-01-16 RX ADMIN — PROPOFOL 20 MG: 10 INJECTION, EMULSION INTRAVENOUS at 10:52

## 2023-01-16 RX ADMIN — PROPOFOL 20 MG: 10 INJECTION, EMULSION INTRAVENOUS at 10:40

## 2023-01-16 RX ADMIN — PROPOFOL 20 MG: 10 INJECTION, EMULSION INTRAVENOUS at 10:44

## 2023-01-16 RX ADMIN — Medication 40 MG: at 10:47

## 2023-01-16 RX ADMIN — LIDOCAINE HYDROCHLORIDE 50 MG: 10 INJECTION, SOLUTION EPIDURAL; INFILTRATION; INTRACAUDAL at 10:36

## 2023-01-16 RX ADMIN — PROPOFOL 80 MG: 10 INJECTION, EMULSION INTRAVENOUS at 10:36

## 2023-01-16 RX ADMIN — SODIUM CHLORIDE, SODIUM LACTATE, POTASSIUM CHLORIDE, AND CALCIUM CHLORIDE: .6; .31; .03; .02 INJECTION, SOLUTION INTRAVENOUS at 10:32

## 2023-01-16 RX ADMIN — PROPOFOL 20 MG: 10 INJECTION, EMULSION INTRAVENOUS at 10:56

## 2023-01-16 NOTE — ANESTHESIA POSTPROCEDURE EVALUATION
Post-Op Assessment Note    CV Status:  Stable  Pain Score: 0    Pain management: adequate     Mental Status:  Alert   Hydration Status:  Euvolemic   PONV Controlled:  None   Airway Patency:  Patent      Post Op Vitals Reviewed: Yes      Staff: CRNA         No notable events documented      BP (!) 94/47 (01/16/23 1102)    Temp (!) 97 2 °F (36 2 °C) (01/16/23 1102)    Pulse 60 (01/16/23 1102)   Resp 18 (01/16/23 1102)    SpO2 100 % (01/16/23 1102)

## 2023-01-16 NOTE — H&P
History and Physical - [facility]  Delores Meraz 65 y o  female MRN: [id number]                  HPI: Elma Fisher is a 65y o  year old female who presents for colonoscopy      REVIEW OF SYSTEMS: Per the HPI, and otherwise unremarkable      Historical Information   Past Medical History:   Diagnosis Date   • Anxiety disorder     Last Assessed:11/12/2013   • Hematuria     Last Assessed:12/10/2012   • Kidney stone    • Lyme disease     Last Assessed:12/10/2012   • Migraine     Last Assessed:3/20/2014   • Vitamin D deficiency     Last Assessed:3/20/2014     Past Surgical History:   Procedure Laterality Date   • CHOLECYSTECTOMY     • MANDIBLE FRACTURE SURGERY     • MANDIBLE SURGERY      wired after broken   • TUBAL LIGATION     • WISDOM TOOTH EXTRACTION     • WRIST SURGERY       Social History   Social History     Substance and Sexual Activity   Alcohol Use Yes   • Alcohol/week: 1 0 standard drink   • Types: 1 Glasses of wine per week    Comment: monthly ; social     Social History     Substance and Sexual Activity   Drug Use No     Social History     Tobacco Use   Smoking Status Never   Smokeless Tobacco Never     Family History   Problem Relation Age of Onset   • Breast cancer Mother 68   • Hypothyroidism Mother    • Other Sister         renal disease   • Prostate cancer Brother    • Diabetes Maternal Grandmother    • Diabetes Maternal Grandfather    • Diabetes Paternal Grandmother    • Hypertension Paternal Grandmother    • Diabetes Paternal Grandfather    • No Known Problems Father    • No Known Problems Daughter    • Leukemia Maternal Aunt    • No Known Problems Paternal Aunt        Meds/Allergies       Current Outpatient Medications:   •  Cholecalciferol (VITAMIN D) 50 MCG (2000 UT) tablet  •  Multiple Vitamins-Minerals (MULTIVITAMIN ADULT PO)  •  Turmeric 500 MG TABS    Allergies   Allergen Reactions   • Propranolol Fatigue   • Augmentin [Amoxicillin-Pot Clavulanate] Rash   • Topiramate Other (See Comments)     depression       Objective     /53   Pulse 63   Temp (!) 97 4 °F (36 3 °C) (Temporal)   Resp 18   Ht 5' 5" (1 651 m)   Wt 63 kg (139 lb)   SpO2 100%   BMI 23 13 kg/m²       PHYSICAL EXAM    Gen: NAD  Head: NCAT  CV: RRR  CHEST: Clear  ABD: soft, NT/ND  EXT: no edema      ASSESSMENT/PLAN:  This is a 65y o  year old female here for colonoscopy, and she is stable and optimized for her procedure

## 2023-01-16 NOTE — ANESTHESIA PREPROCEDURE EVALUATION
Procedure:  COLONOSCOPY    Relevant Problems   No relevant active problems        Physical Exam    Airway    Mallampati score: II         Dental   No notable dental hx     Cardiovascular  Rate: normal,     Pulmonary  Pulmonary exam normal     Other Findings        Anesthesia Plan  ASA Score- 1     Anesthesia Type- IV sedation with anesthesia with ASA Monitors  Additional Monitors:   Airway Plan:           Plan Factors-    Chart reviewed  Existing labs reviewed  Patient summary reviewed  Induction- intravenous  Postoperative Plan-     Informed Consent- Anesthetic plan and risks discussed with patient  I personally reviewed this patient with the CRNA  Discussed and agreed on the Anesthesia Plan with the CRNA  Charli Esparza

## 2023-08-04 ENCOUNTER — ESTABLISHED COMPREHENSIVE EXAM (OUTPATIENT)
Dept: URBAN - METROPOLITAN AREA CLINIC 6 | Facility: CLINIC | Age: 63
End: 2023-08-04

## 2023-08-04 DIAGNOSIS — H25.13: ICD-10-CM

## 2023-08-04 DIAGNOSIS — H40.013: ICD-10-CM

## 2023-08-04 DIAGNOSIS — H43.813: ICD-10-CM

## 2023-08-04 DIAGNOSIS — H40.033: ICD-10-CM

## 2023-08-04 PROCEDURE — 92133 CPTRZD OPH DX IMG PST SGM ON: CPT

## 2023-08-04 PROCEDURE — 76514 ECHO EXAM OF EYE THICKNESS: CPT

## 2023-08-04 PROCEDURE — 92202 OPSCPY EXTND ON/MAC DRAW: CPT

## 2023-08-04 PROCEDURE — 92014 COMPRE OPH EXAM EST PT 1/>: CPT

## 2023-08-04 ASSESSMENT — PACHYMETRY
OS_CT_UM: 556
OD_CT_UM: 560

## 2023-08-04 ASSESSMENT — KERATOMETRY
OS_AXISANGLE_DEGREES: 74
OD_AXISANGLE_DEGREES: 116
OS_AXISANGLE2_DEGREES: 164
OD_AXISANGLE2_DEGREES: 26
OS_K2POWER_DIOPTERS: 43.00
OS_K1POWER_DIOPTERS: 42.25
OD_K1POWER_DIOPTERS: 42.25
OD_K2POWER_DIOPTERS: 42.50

## 2023-08-04 ASSESSMENT — VISUAL ACUITY
OS_CC: 20/30+2
OD_CC: 20/25
OU_CC: J2

## 2023-08-04 ASSESSMENT — TONOMETRY
OS_IOP_MMHG: 13
OD_IOP_MMHG: 12

## 2024-01-15 ENCOUNTER — OFFICE VISIT (OUTPATIENT)
Dept: FAMILY MEDICINE CLINIC | Facility: CLINIC | Age: 64
End: 2024-01-15

## 2024-01-15 VITALS
HEART RATE: 69 BPM | DIASTOLIC BLOOD PRESSURE: 73 MMHG | RESPIRATION RATE: 16 BRPM | WEIGHT: 143 LBS | OXYGEN SATURATION: 99 % | HEIGHT: 66 IN | BODY MASS INDEX: 22.98 KG/M2 | TEMPERATURE: 98.1 F | SYSTOLIC BLOOD PRESSURE: 107 MMHG

## 2024-01-15 DIAGNOSIS — E55.9 VITAMIN D DEFICIENCY: ICD-10-CM

## 2024-01-15 DIAGNOSIS — Z00.00 ANNUAL PHYSICAL EXAM: Primary | ICD-10-CM

## 2024-01-15 DIAGNOSIS — Z13.6 SCREENING FOR CARDIOVASCULAR CONDITION: ICD-10-CM

## 2024-01-15 DIAGNOSIS — Z12.4 SCREENING FOR CERVICAL CANCER: ICD-10-CM

## 2024-01-15 DIAGNOSIS — Z12.31 ENCOUNTER FOR SCREENING MAMMOGRAM FOR BREAST CANCER: ICD-10-CM

## 2024-01-15 PROCEDURE — 99396 PREV VISIT EST AGE 40-64: CPT | Performed by: FAMILY MEDICINE

## 2024-01-15 RX ORDER — FOLIC ACID 0.8 MG
TABLET ORAL
COMMUNITY

## 2024-01-15 RX ORDER — CHLORAL HYDRATE 500 MG
CAPSULE ORAL
COMMUNITY

## 2024-01-15 RX ORDER — VITAMIN E 268 MG
CAPSULE ORAL
COMMUNITY

## 2024-01-15 RX ORDER — PSYLLIUM HUSK 0.4 G
CAPSULE ORAL
COMMUNITY

## 2024-01-15 NOTE — PROGRESS NOTES
ADULT ANNUAL PHYSICAL  [facility]    NAME: Alison Meraz  AGE: 63 y.o. SEX: female  : 1960     DATE: 1/15/2024     Assessment and Plan:     Problem List Items Addressed This Visit       Annual physical exam - Primary     Other Visit Diagnoses       Encounter for screening mammogram for breast cancer        Relevant Orders    Mammo screening bilateral w 3d & cad    Screening for cervical cancer        Relevant Orders    Liquid-based pap, screening ([facility])    Screening for cardiovascular condition        Relevant Orders    Lipid panel    Comprehensive metabolic panel    Vitamin D deficiency        Relevant Orders    Vitamin D 25 hydroxy          Immunizations and preventive care screenings were discussed with patient today. Appropriate education was printed on patient's after visit summary.    Counseling:  Alcohol/drug use: discussed moderation in alcohol intake, the recommendations for healthy alcohol use, and avoidance of illicit drug use.  Dental Health: discussed importance of regular tooth brushing, flossing, and dental visits.  Injury prevention: discussed safety/seat belts, safety helmets, smoke detectors, carbon dioxide detectors, and smoking near bedding or upholstery.  Exercise: the importance of regular exercise/physical activity was discussed. Recommend exercise 3-5 times per week for at least 30 minutes.          Return in 1 year (on 1/15/2025).     Chief Complaint:     Chief Complaint   Patient presents with    Annual Exam      History of Present Illness:     Adult Annual Physical   Patient here for a comprehensive physical exam. The patient reports occasional feelings of incomplete bladder emptying, no loss of continence, no post void residual.    Diet and Physical Activity  Diet/Nutrition: well balanced diet.   Exercise: no formal exercise and , spends a significant amount of time chasing her grandchildren .       Depression Screening  PHQ-2/9 Depression Screening    Little interest or pleasure in doing things: 0 - not at all  Feeling down, depressed, or hopeless: 0 - not at all  PHQ-2 Score: 0  PHQ-2 Interpretation: Negative depression screen       General Health  Sleep: sleeps well and gets 7-8 hours of sleep on average.   Hearing: normal - bilateral.  Vision: no vision problems.   Dental: regular dental visits.       /GYN Health  Follows with gynecology? yes   Patient is: postmenopausal.    Advanced Care Planning  Do you have an advanced directive? yes  Do you have a durable medical power of ? yes     Review of Systems:     Review of Systems   Constitutional:  Negative for chills and fever.   HENT:  Negative for ear pain and sore throat.    Eyes:  Negative for pain and visual disturbance.   Respiratory:  Negative for cough and shortness of breath.    Cardiovascular:  Negative for chest pain and palpitations.   Gastrointestinal:  Negative for abdominal pain and vomiting.   Genitourinary:  Negative for dysuria and hematuria.   Musculoskeletal:  Negative for arthralgias and back pain.   Skin:  Negative for color change and rash.   Neurological:  Negative for seizures and syncope.   All other systems reviewed and are negative.     Past Medical History:     Past Medical History:   Diagnosis Date    Anxiety disorder     Last Assessed:11/12/2013    Hematuria     Last Assessed:12/10/2012    Kidney stone     Lyme disease     Last Assessed:12/10/2012    Migraine     Last Assessed:3/20/2014    Vitamin D deficiency     Last Assessed:3/20/2014      Past Surgical History:     Past Surgical History:   Procedure Laterality Date    CHOLECYSTECTOMY      MANDIBLE FRACTURE SURGERY      MANDIBLE SURGERY      wired after broken    TUBAL LIGATION      WISDOM TOOTH EXTRACTION      WRIST SURGERY        Social History:     Social History     Socioeconomic History    Marital status: /Civil Union     Spouse name: None    Number of  children: None    Years of education: None    Highest education level: None   Occupational History    None   Tobacco Use    Smoking status: Never    Smokeless tobacco: Never   Vaping Use    Vaping status: Never Used   Substance and Sexual Activity    Alcohol use: Not Currently     Alcohol/week: 1.0 standard drink of alcohol     Types: 1 Glasses of wine per week     Comment: No drinks since 10/2023    Drug use: Never    Sexual activity: Not Currently     Partners: Male     Birth control/protection: Post-menopausal   Other Topics Concern    None   Social History Narrative    None     Social Determinants of Health     Financial Resource Strain: Low Risk  (1/15/2024)    Overall Financial Resource Strain (CARDIA)     Difficulty of Paying Living Expenses: Not hard at all   Food Insecurity: No Food Insecurity (1/15/2024)    Hunger Vital Sign     Worried About Running Out of Food in the Last Year: Never true     Ran Out of Food in the Last Year: Never true   Transportation Needs: No Transportation Needs (1/15/2024)    PRAPARE - Transportation     Lack of Transportation (Medical): No     Lack of Transportation (Non-Medical): No   Physical Activity: Sufficiently Active (1/15/2024)    Exercise Vital Sign     Days of Exercise per Week: 5 days     Minutes of Exercise per Session: 60 min   Stress: No Stress Concern Present (1/15/2024)    [employer] [place] [employer] - Occupational Stress Questionnaire     Feeling of Stress : Not at all   Social Connections: Moderately Isolated (1/15/2024)    Social Connection and Isolation Panel [NHANES]     Frequency of Communication with Friends and Family: More than three times a week     Frequency of Social Gatherings with Friends and Family: More than three times a week     Attends Tenriism Services: Never     Active Member of Clubs or Organizations: No     Attends Club or Organization Meetings: Never     Marital Status:    Intimate Partner Violence: Not At Risk  "(1/15/2024)    Humiliation, Afraid, Rape, and Kick questionnaire     Fear of Current or Ex-Partner: No     Emotionally Abused: No     Physically Abused: No     Sexually Abused: No   Housing Stability: Unknown (1/15/2024)    Housing Stability Vital Sign     Unable to Pay for Housing in the Last Year: No     Number of Places Lived in the Last Year: Not on file     Unstable Housing in the Last Year: No      Family History:     Family History   Problem Relation Age of Onset    Breast cancer Mother 76    Hypothyroidism Mother     Other Sister         renal disease    Prostate cancer Brother     Diabetes Maternal Grandmother     Diabetes Maternal Grandfather     Diabetes Paternal Grandmother     Hypertension Paternal Grandmother     Diabetes Paternal Grandfather     No Known Problems Father     No Known Problems Daughter     Leukemia Maternal Aunt     No Known Problems Paternal Aunt       Current Medications:     Current Outpatient Medications   Medication Sig Dispense Refill    Calcium Carb-Cholecalciferol (Calcium 1000 + D) 1000-20 MG-MCG TABS       Cholecalciferol (VITAMIN D) 50 MCG (2000 UT) tablet Take 2,000 Units by mouth daily      Magnesium 500 MG CAPS       Multiple Vitamins-Minerals (MULTIVITAMIN ADULT PO) Take 1 tablet by mouth daily      Omega-3 1000 MG CAPS       vitamin E, tocopherol, 400 units capsule       Turmeric 500 MG TABS Take 1 tablet by mouth daily (Patient not taking: Reported on 1/15/2024)       No current facility-administered medications for this visit.      Allergies:     Allergies   Allergen Reactions    Propranolol Fatigue    Augmentin [Amoxicillin-Pot Clavulanate] Rash    Topiramate Other (See Comments)     depression      Physical Exam:     /73 (BP Location: Left arm, Patient Position: Sitting, Cuff Size: Standard)   Pulse 69   Temp 98.1 °F (36.7 °C) (Temporal)   Resp 16   Ht 5' 5.5\" (1.664 m)   Wt 64.9 kg (143 lb)   SpO2 99%   BMI 23.43 kg/m²     Physical Exam  Vitals and " nursing note reviewed.   Constitutional:       General: She is not in acute distress.     Appearance: She is well-developed.   HENT:      Head: Normocephalic and atraumatic.   Eyes:      Conjunctiva/sclera: Conjunctivae normal.   Cardiovascular:      Rate and Rhythm: Normal rate and regular rhythm.      Heart sounds: No murmur heard.  Pulmonary:      Effort: Pulmonary effort is normal. No respiratory distress.      Breath sounds: Normal breath sounds.   Abdominal:      Palpations: Abdomen is soft.      Tenderness: There is no abdominal tenderness.   Musculoskeletal:         General: No swelling.      Cervical back: Neck supple.   Skin:     General: Skin is warm and dry.      Capillary Refill: Capillary refill takes less than 2 seconds.   Neurological:      Mental Status: She is alert.   Psychiatric:         Mood and Affect: Mood normal.        Hoang Hong MD  [facility]

## 2024-01-27 ENCOUNTER — APPOINTMENT (OUTPATIENT)
Dept: LAB | Facility: CLINIC | Age: 64
End: 2024-01-27
Payer: COMMERCIAL

## 2024-01-27 DIAGNOSIS — E55.9 VITAMIN D DEFICIENCY: ICD-10-CM

## 2024-01-27 DIAGNOSIS — Z13.6 SCREENING FOR CARDIOVASCULAR CONDITION: ICD-10-CM

## 2024-01-27 DIAGNOSIS — Z12.4 SCREENING FOR CERVICAL CANCER: ICD-10-CM

## 2024-01-27 LAB
25(OH)D3 SERPL-MCNC: 26.6 NG/ML (ref 30–100)
ALBUMIN SERPL BCP-MCNC: 4.2 G/DL (ref 3.5–5)
ALP SERPL-CCNC: 53 U/L (ref 34–104)
ALT SERPL W P-5'-P-CCNC: 17 U/L (ref 7–52)
ANION GAP SERPL CALCULATED.3IONS-SCNC: 7 MMOL/L
AST SERPL W P-5'-P-CCNC: 16 U/L (ref 13–39)
BILIRUB SERPL-MCNC: 0.52 MG/DL (ref 0.2–1)
BUN SERPL-MCNC: 17 MG/DL (ref 5–25)
CALCIUM SERPL-MCNC: 9.8 MG/DL (ref 8.4–10.2)
CHLORIDE SERPL-SCNC: 101 MMOL/L (ref 96–108)
CHOLEST SERPL-MCNC: 221 MG/DL
CO2 SERPL-SCNC: 31 MMOL/L (ref 21–32)
CREAT SERPL-MCNC: 0.71 MG/DL (ref 0.6–1.3)
GFR SERPL CREATININE-BSD FRML MDRD: 91 ML/MIN/1.73SQ M
GLUCOSE P FAST SERPL-MCNC: 92 MG/DL (ref 65–99)
HDLC SERPL-MCNC: 52 MG/DL
LDLC SERPL CALC-MCNC: 139 MG/DL (ref 0–100)
NONHDLC SERPL-MCNC: 169 MG/DL
POTASSIUM SERPL-SCNC: 4.6 MMOL/L (ref 3.5–5.3)
PROT SERPL-MCNC: 6.9 G/DL (ref 6.4–8.4)
SODIUM SERPL-SCNC: 139 MMOL/L (ref 135–147)
TRIGL SERPL-MCNC: 149 MG/DL

## 2024-01-27 PROCEDURE — 80053 COMPREHEN METABOLIC PANEL: CPT

## 2024-01-27 PROCEDURE — 82306 VITAMIN D 25 HYDROXY: CPT

## 2024-01-27 PROCEDURE — 36415 COLL VENOUS BLD VENIPUNCTURE: CPT

## 2024-01-27 PROCEDURE — 80061 LIPID PANEL: CPT

## 2024-04-16 DIAGNOSIS — Z00.6 ENCOUNTER FOR EXAMINATION FOR NORMAL COMPARISON OR CONTROL IN CLINICAL RESEARCH PROGRAM: ICD-10-CM

## 2024-05-09 ENCOUNTER — APPOINTMENT (OUTPATIENT)
Dept: LAB | Facility: CLINIC | Age: 64
End: 2024-05-09

## 2024-05-09 DIAGNOSIS — Z00.6 ENCOUNTER FOR EXAMINATION FOR NORMAL COMPARISON OR CONTROL IN CLINICAL RESEARCH PROGRAM: ICD-10-CM

## 2024-05-09 PROCEDURE — 36415 COLL VENOUS BLD VENIPUNCTURE: CPT

## 2024-05-19 LAB
APOB+LDLR+PCSK9 GENE MUT ANL BLD/T: NOT DETECTED
BRCA1+BRCA2 DEL+DUP + FULL MUT ANL BLD/T: NOT DETECTED
MLH1+MSH2+MSH6+PMS2 GN DEL+DUP+FUL M: NOT DETECTED

## 2024-06-18 ENCOUNTER — HOSPITAL ENCOUNTER (OUTPATIENT)
Dept: MAMMOGRAPHY | Facility: HOSPITAL | Age: 64
Discharge: HOME | End: 2024-06-18
Payer: COMMERCIAL

## 2024-06-18 VITALS — HEIGHT: 65 IN | BODY MASS INDEX: 23.82 KG/M2 | WEIGHT: 143 LBS

## 2024-06-18 DIAGNOSIS — Z12.31 ENCOUNTER FOR SCREENING MAMMOGRAM FOR BREAST CANCER: ICD-10-CM

## 2024-06-18 PROCEDURE — 77063 BREAST TOMOSYNTHESIS BI: CPT

## 2024-06-18 PROCEDURE — 77067 SCR MAMMO BI INCL CAD: CPT

## 2024-07-22 ENCOUNTER — OFFICE VISIT (OUTPATIENT)
Dept: OBGYN CLINIC | Facility: CLINIC | Age: 64
End: 2024-07-22
Payer: COMMERCIAL

## 2024-07-22 VITALS
BODY MASS INDEX: 24.09 KG/M2 | HEIGHT: 65 IN | SYSTOLIC BLOOD PRESSURE: 118 MMHG | DIASTOLIC BLOOD PRESSURE: 60 MMHG | WEIGHT: 144.6 LBS

## 2024-07-22 DIAGNOSIS — Z01.419 WELL WOMAN EXAM WITH ROUTINE GYNECOLOGICAL EXAM: Primary | ICD-10-CM

## 2024-07-22 DIAGNOSIS — Z12.31 ENCOUNTER FOR SCREENING MAMMOGRAM FOR MALIGNANT NEOPLASM OF BREAST: ICD-10-CM

## 2024-07-22 DIAGNOSIS — Z12.4 ENCOUNTER FOR SCREENING FOR MALIGNANT NEOPLASM OF CERVIX: ICD-10-CM

## 2024-07-22 DIAGNOSIS — Z11.51 SCREENING FOR HPV (HUMAN PAPILLOMAVIRUS): ICD-10-CM

## 2024-07-22 PROBLEM — Z00.00 ANNUAL PHYSICAL EXAM: Status: RESOLVED | Noted: 2022-12-22 | Resolved: 2024-07-22

## 2024-07-22 PROCEDURE — G0476 HPV COMBO ASSAY CA SCREEN: HCPCS

## 2024-07-22 PROCEDURE — G0145 SCR C/V CYTO,THINLAYER,RESCR: HCPCS

## 2024-07-22 PROCEDURE — S0610 ANNUAL GYNECOLOGICAL EXAMINA: HCPCS

## 2024-07-22 NOTE — PROGRESS NOTES
ASSESSMENT & PLAN:   Diagnoses and all orders for this visit:    Well woman exam with routine gynecological exam  -     Liquid-based pap, screening    Encounter for screening for malignant neoplasm of cervix  -     Liquid-based pap, screening    Screening for HPV (human papillomavirus)  -     Liquid-based pap, screening    Encounter for screening mammogram for malignant neoplasm of breast  -     Mammo screening bilateral w 3d & cad; Future      The following were reviewed in today's visit: ASCCP guidelines, Gardisil vaccination, STD testing breast self exam, mammography screening ordered, STD testing, exercise, and healthy diet.    Patient to return to office in yearly for annual exam.     All questions have been answered to her satisfaction.    CC:  Annual Gynecologic Examination  Chief Complaint   Patient presents with    Gynecologic Exam     Pap 18 wnl, hpv-   Mammo 25   Colonoscopy 24, repeat 10 years        HPI: Alison Meraz is a 64 y.o.  who presents for annual gynecologic examination.  She has the following concerns:  none.       Health Maintenance:    Exercise: frequently, walking   Breast exams/breast awareness: yes  Last mammogram: 2024, [facility]2  Colorectal cancer screening: colonoscopy 23, repeat 10 years    Past Medical History:   Diagnosis Date    Anxiety disorder     Last Assessed:2013    Hematuria     Last Assessed:12/10/2012    Kidney stone     Lyme disease     Last Assessed:12/10/2012    Migraine     Last Assessed:3/20/2014    Vitamin D deficiency     Last Assessed:3/20/2014     Past Surgical History:   Procedure Laterality Date    CHOLECYSTECTOMY      MANDIBLE FRACTURE SURGERY      MANDIBLE SURGERY      wired after broken    TUBAL LIGATION      WISDOM TOOTH EXTRACTION      WRIST SURGERY       Past OB/Gyn History:   No LMP recorded. Patient is postmenopausal.    Menopausal status: postmenopausal  Menopausal symptoms: None    Last Pap: 2018 : no  abnormalities  History of abnormal Pap smear: no    Patient is not currently sexually active.   STD testing: no  Current contraception: post menopausal status    Family History  Family History   Problem Relation Age of Onset    Breast cancer Mother 76    Hypothyroidism Mother     No Known Problems Father     Other Sister         renal disease    No Known Problems Daughter     Diabetes Maternal Grandmother     Diabetes Maternal Grandfather     Diabetes Paternal Grandmother     Hypertension Paternal Grandmother     Diabetes Paternal Grandfather     Prostate cancer Brother     Leukemia Maternal Aunt     No Known Problems Paternal Aunt    Family history of uterine or ovarian cancer: no  Family history of breast cancer: yes, mother, maternal great aunt  Family history of colon cancer: no    Social History:  Social History     Socioeconomic History    Marital status: /Civil Union     Spouse name: Not on file    Number of children: Not on file    Years of education: Not on file    Highest education level: Not on file   Occupational History    Not on file   Tobacco Use    Smoking status: Never    Smokeless tobacco: Never   Vaping Use    Vaping status: Never Used   Substance and Sexual Activity    Alcohol use: Not Currently     Alcohol/week: 1.0 standard drink of alcohol     Types: 1 Glasses of wine per week     Comment: No drinks since 10/2023    Drug use: Never    Sexual activity: Not Currently     Partners: Male     Birth control/protection: Post-menopausal   Other Topics Concern    Not on file   Social History Narrative    Not on file     Social Determinants of Health     Financial Resource Strain: Low Risk  (1/15/2024)    Overall Financial Resource Strain (CARDIA)     Difficulty of Paying Living Expenses: Not hard at all   Food Insecurity: No Food Insecurity (1/15/2024)    Hunger Vital Sign     Worried About Running Out of Food in the Last Year: Never true     Ran Out of Food in the Last Year: Never true    Transportation Needs: No Transportation Needs (1/15/2024)    PRAPARE - Transportation     Lack of Transportation (Medical): No     Lack of Transportation (Non-Medical): No   Physical Activity: Sufficiently Active (1/15/2024)    Exercise Vital Sign     Days of Exercise per Week: 5 days     Minutes of Exercise per Session: 60 min   Stress: No Stress Concern Present (1/15/2024)    [employer] [place] [employer]ational [employer] - Occupational Stress Questionnaire     Feeling of Stress : Not at all   Social Connections: Moderately Isolated (1/15/2024)    Social Connection and Isolation Panel [NHANES]     Frequency of Communication with Friends and Family: More than three times a week     Frequency of Social Gatherings with Friends and Family: More than three times a week     Attends Adventism Services: Never     Active Member of Clubs or Organizations: No     Attends Club or Organization Meetings: Never     Marital Status:    Intimate Partner Violence: Not At Risk (1/15/2024)    Humiliation, Afraid, Rape, and Kick questionnaire     Fear of Current or Ex-Partner: No     Emotionally Abused: No     Physically Abused: No     Sexually Abused: No   Housing Stability: Unknown (1/15/2024)    Housing Stability Vital Sign     Unable to Pay for Housing in the Last Year: No     Number of Times Moved in the Last Year: Not on file     Homeless in the Last Year: No     Domestic violence screen: negative    Allergies:  Allergies   Allergen Reactions    Propranolol Fatigue    Augmentin [Amoxicillin-Pot Clavulanate] Rash    Topiramate Other (See Comments)     depression     Medications:    Current Outpatient Medications:     Calcium Carb-Cholecalciferol (Calcium 1000 + D) 1000-20 MG-MCG TABS, , Disp: , Rfl:     Cholecalciferol (VITAMIN D) 50 MCG (2000 UT) tablet, Take 2,000 Units by mouth daily, Disp: , Rfl:     Magnesium 500 MG CAPS, , Disp: , Rfl:     Multiple Vitamins-Minerals (MULTIVITAMIN ADULT PO), Take 1 tablet by mouth daily,  "Disp: , Rfl:     Omega-3 1000 MG CAPS, , Disp: , Rfl:     vitamin E, tocopherol, 400 units capsule, , Disp: , Rfl:     Turmeric 500 MG TABS, Take 1 tablet by mouth daily (Patient not taking: Reported on 1/15/2024), Disp: , Rfl:     Review of Systems:  Review of Systems   Constitutional:  Negative for chills and fever.   Respiratory:  Negative for shortness of breath.    Cardiovascular:  Negative for chest pain.   Gastrointestinal:  Negative for abdominal pain, constipation and diarrhea.   Genitourinary:  Negative for dysuria, frequency, pelvic pain, vaginal discharge and vaginal pain.   Psychiatric/Behavioral:  Negative for self-injury and suicidal ideas.      Physical Exam:  /60 (BP Location: Right arm, Patient Position: Sitting, Cuff Size: Standard)   Ht 5' 5\" (1.651 m)   Wt 65.6 kg (144 lb 9.6 oz)   BMI 24.06 kg/m²    Physical Exam  Constitutional:       Appearance: Normal appearance.   Genitourinary:      Vulva and urethral meatus normal.      No labial fusion noted.      No vaginal erythema or tenderness.        Right Adnexa: not tender.     Left Adnexa: not tender.     No cervical discharge or friability.      Uterus is not tender.   Breasts:     Breasts are symmetrical.      Right: Normal.      Left: Normal.   HENT:      Head: Normocephalic and atraumatic.   Neck:      Thyroid: No thyroid mass or thyroid tenderness.   Cardiovascular:      Rate and Rhythm: Normal rate and regular rhythm.      Heart sounds: Normal heart sounds. No murmur heard.  Pulmonary:      Effort: Pulmonary effort is normal.      Breath sounds: Normal breath sounds. No wheezing.   Abdominal:      Palpations: Abdomen is soft. There is no mass.      Tenderness: There is no abdominal tenderness.   Lymphadenopathy:      Cervical: No cervical adenopathy.   Neurological:      General: No focal deficit present.      Mental Status: She is alert and oriented to person, place, and time.   Skin:     General: Skin is warm and dry. "   Psychiatric:         Mood and Affect: Mood normal.         Behavior: Behavior normal.   Vitals and nursing note reviewed.

## 2024-07-23 LAB
HPV HR 12 DNA CVX QL NAA+PROBE: NEGATIVE
HPV16 DNA CVX QL NAA+PROBE: NEGATIVE
HPV18 DNA CVX QL NAA+PROBE: NEGATIVE

## 2024-07-26 LAB
LAB AP GYN PRIMARY INTERPRETATION: NORMAL
Lab: NORMAL

## 2024-11-27 ENCOUNTER — OFFICE VISIT (OUTPATIENT)
Dept: FAMILY MEDICINE CLINIC | Facility: CLINIC | Age: 64
End: 2024-11-27

## 2024-11-27 VITALS
WEIGHT: 144.4 LBS | BODY MASS INDEX: 24.03 KG/M2 | OXYGEN SATURATION: 98 % | HEART RATE: 80 BPM | RESPIRATION RATE: 18 BRPM | DIASTOLIC BLOOD PRESSURE: 67 MMHG | TEMPERATURE: 98.6 F | SYSTOLIC BLOOD PRESSURE: 99 MMHG

## 2024-11-27 DIAGNOSIS — R07.0 PAIN IN THROAT: Primary | ICD-10-CM

## 2024-11-27 PROCEDURE — 99213 OFFICE O/P EST LOW 20 MIN: CPT | Performed by: FAMILY MEDICINE

## 2024-11-27 NOTE — PATIENT INSTRUCTIONS
Most likely viral infection  Tylenol 500 mg three times a day  Hydrate well  Wear mask for a few days   Monitor vitals at home  Follow up as needed

## 2024-11-27 NOTE — PROGRESS NOTES
Name: Alison Meraz      : 1960      MRN: [id number]  Encounter Provider: Génesis Franco MD  Encounter Date: 2024   Encounter department: [facility] [place]  :  Assessment & Plan  Pain in throat  Patient reports pain on the anterior left side of the neck and swollen glands for 2 days.  Patient has sick contact with her granddaughter who is sick with viral infections.  Denied fever, chills, cough, sputum production, chest pain, shortness of breath, dizziness, any ear infection.   Centor score 0  No exudate, no erythema on tonsils on physical exam.  Patient have not received flu shot and denied to receive it.  Most likely viral infections.    Plans:  OTC acetaminophen 500 mg three times a day as needed for a few days.  Hydrate well with drinking water and fluid  Wear mask for a few days   Monitor vitals ( BP and temperature) at home  Follow up as needed              History of Present Illness     Alison Meraz is a 64-year-old lady who presents to the office today with chief complaint of pain and palpable glands on the anterior part of the neck on the left side which started yesterday.  She babysit her 2 years old granddaughter yesterday.  Her granddaughter is sick with some viral infections today.  She denied any recent travel, fever, chills, cough, sputum production, chest pain, shortness of breath, dizziness, any ear infection.  She reports some pain with swallowing foods.  However denied loss of appetite and unintentional weight loss.      Review of Systems   Constitutional:  Negative for appetite change, chills, fatigue, fever and unexpected weight change.   HENT:  Positive for trouble swallowing. Negative for congestion, dental problem, drooling, ear discharge, ear pain, facial swelling, postnasal drip, rhinorrhea, sinus pressure, sinus pain and sore throat.         On the left side of the anterior neck for 2 days   Eyes:  Negative for pain and visual disturbance.    Respiratory:  Negative for apnea, cough, choking, chest tightness, shortness of breath, wheezing and stridor.    Cardiovascular:  Negative for chest pain, palpitations and leg swelling.   Gastrointestinal:  Negative for abdominal distention, abdominal pain, diarrhea, nausea and vomiting.   Genitourinary: Negative.  Negative for dysuria and hematuria.   Musculoskeletal:  Negative for arthralgias and back pain.   Skin:  Negative for color change and rash.   Neurological:  Negative for dizziness, seizures, syncope and headaches.   Psychiatric/Behavioral:  Negative for agitation and confusion.    All other systems reviewed and are negative.    Pertinent Medical History             Medical History Reviewed by provider this encounter:  Problems     .     Objective   BP 99/67 (BP Location: Left arm, Patient Position: Sitting, Cuff Size: Standard)   Pulse 80   Temp 98.6 °F (37 °C) (Temporal)   Resp 18   Wt 65.5 kg (144 lb 6.4 oz)   SpO2 98%   BMI 24.03 kg/m²      Physical Exam  Vitals and nursing note reviewed.   Constitutional:       General: She is not in acute distress.     Appearance: Normal appearance. She is well-developed. She is not ill-appearing or toxic-appearing.   HENT:      Head: Normocephalic and atraumatic.      Left Ear: Ear canal and external ear normal.      Mouth/Throat:      Mouth: Mucous membranes are moist.      Pharynx: Oropharynx is clear. No oropharyngeal exudate or posterior oropharyngeal erythema.      Comments: No oral thrush, no active dental issues.    Eyes:      Extraocular Movements: Extraocular movements intact.      Conjunctiva/sclera: Conjunctivae normal.      Pupils: Pupils are equal, round, and reactive to light.   Neck:      Comments:  Only a few small neck glands on the left anterior side.  No firm glands palpated. No tenderness on the left side of the neck.  No axillary lymph no palpable      Cardiovascular:      Rate and Rhythm: Normal rate and regular rhythm.      Heart  sounds: Normal heart sounds. No murmur heard.     No gallop.   Pulmonary:      Effort: Pulmonary effort is normal. No respiratory distress.      Breath sounds: Normal breath sounds. No wheezing or rales.   Abdominal:      General: Bowel sounds are normal. There is no distension.      Palpations: Abdomen is soft.      Tenderness: There is no abdominal tenderness.   Musculoskeletal:         General: No swelling.      Cervical back: Neck supple. No tenderness.      Comments: Left TM joint nontender   Skin:     General: Skin is warm and dry.      Capillary Refill: Capillary refill takes less than 2 seconds.      Coloration: Skin is not jaundiced or pale.      Findings: No lesion or rash.   Neurological:      Mental Status: She is alert and oriented to person, place, and time.   Psychiatric:         Mood and Affect: Mood normal.         I have spent a total time of 20 minutes in caring for this patient on the day of the visit/encounter including Risks and benefits of tx options, Instructions for management, Patient and family education, Risk factor reductions, Reviewing / ordering tests, medicine, procedures  , and Obtaining or reviewing history  .

## 2024-11-29 ENCOUNTER — HOSPITAL ENCOUNTER (EMERGENCY)
Facility: HOSPITAL | Age: 64
Discharge: HOME | End: 2024-11-29
Attending: EMERGENCY MEDICINE
Payer: COMMERCIAL

## 2024-11-29 VITALS
SYSTOLIC BLOOD PRESSURE: 141 MMHG | OXYGEN SATURATION: 99 % | RESPIRATION RATE: 18 BRPM | HEART RATE: 89 BPM | TEMPERATURE: 98 F | DIASTOLIC BLOOD PRESSURE: 67 MMHG

## 2024-11-29 DIAGNOSIS — K11.20 SUBMANDIBULAR SIALOADENITIS: Primary | ICD-10-CM

## 2024-11-29 PROCEDURE — 99283 EMERGENCY DEPT VISIT LOW MDM: CPT

## 2024-11-29 PROCEDURE — 99284 EMERGENCY DEPT VISIT MOD MDM: CPT | Performed by: EMERGENCY MEDICINE

## 2024-11-29 RX ORDER — NAPROXEN 500 MG/1
500 TABLET ORAL 2 TIMES DAILY PRN
Qty: 30 TABLET | Refills: 0 | Status: SHIPPED | OUTPATIENT
Start: 2024-11-29

## 2024-11-29 NOTE — ED PROVIDER NOTES
Time reflects when diagnosis was documented in both MDM as applicable and the Disposition within this note       Time User Action Codes Description Comment    11/29/2024 12:05 PM Mary Ford Add [K11.20] Submandibular sialoadenitis           ED Disposition       ED Disposition   Discharge    Condition   Stable    Date/Time   Fri Nov 29, 2024 12:09 PM    Comment   Alisonsaira Meraz discharge to home/self care.                   Assessment & Plan       Medical Decision Making  63 yo female with history of TMJ presenting with increased pain and swelling near her left submandibular region. She denies fever, chills, trouble speaking, sore throat, muffled voice, chest pain, or shortness of breath. She is hemodynamically stable and protecting her airway without any difficulty. There is some mild swelling in the left submandibular region but no fluctuance or erythema seen on exam. She sees a dentist regularly as well. Possible etiology of pain can include exacerbation of TMJ or submandibular sialadenitis. Doubt abscess given the lack of fever and exam findings suggesting it. Discussed option of obtaining CT neck soft tissue but patient will hold off at this time. We will give a prescription for naproxen and advise patient to suck on sour candies. Will also give referral to ENT. Strict return precautions were given to patient to come back to ER if she develops fever, worsening pain, trouble breathing or swallowing, or chest pain.     Risk  Prescription drug management.             Medications - No data to display    ED Risk Strat Scores                                               History of Present Illness       Chief Complaint   Patient presents with    Sore Throat     Patient c/o sore throat since Tuesday. Throat Increasingly getting more swollen. Denies difficulty breathing. Airway intact at thist richard.        Past Medical History:   Diagnosis Date    Anxiety disorder     Last Assessed:11/12/2013    Hematuria     Last  Assessed:12/10/2012    Kidney stone     Lyme disease     Last Assessed:12/10/2012    Migraine     Last Assessed:3/20/2014    Vitamin D deficiency     Last Assessed:3/20/2014      Past Surgical History:   Procedure Laterality Date    CHOLECYSTECTOMY      MANDIBLE FRACTURE SURGERY      MANDIBLE SURGERY      wired after broken    TUBAL LIGATION      WISDOM TOOTH EXTRACTION      WRIST SURGERY        Family History   Problem Relation Age of Onset    Breast cancer Mother 76    Hypothyroidism Mother     No Known Problems Father     Other Sister         renal disease    No Known Problems Daughter     Diabetes Maternal Grandmother     Diabetes Maternal Grandfather     Diabetes Paternal Grandmother     Hypertension Paternal Grandmother     Diabetes Paternal Grandfather     Prostate cancer Brother     Leukemia Maternal Aunt     No Known Problems Paternal Aunt       Social History     Tobacco Use    Smoking status: Never    Smokeless tobacco: Never   Vaping Use    Vaping status: Never Used   Substance Use Topics    Alcohol use: Not Currently     Alcohol/week: 1.0 standard drink of alcohol     Types: 1 Glasses of wine per week     Comment: No drinks since 10/2023    Drug use: Never      E-Cigarette/Vaping    E-Cigarette Use Never User       E-Cigarette/Vaping Substances    Nicotine No     THC No     CBD No     Flavoring No     Other No     Unknown No       I have reviewed and agree with the history as documented.     65 yo F PMH TMJ at home presenting with left sided jaw pain since Tuesday. She was seen on Tuesday and her symptoms were thought to be viral in nature since she was exposed to her granddaughter who had runny nose and sneezing. The symptoms started Tuesday morning after she heard a loud crack opening her jaw. She thinks that the left side of her face near her jaw is more swollen that usual. She endorses some troubles swallowing (limited by pain-not by swelling). She has since been only eating softer foods. She denies  any tooth pain. She last saw her dentist this past August and had a crown placed on her lower back tooth. No issues noted since. She denies a sore throat, chest pain, fever, chills, hearing loss. She does have a headache located near her jaw. This developed after the jaw pain started. She notes her jaw pain is worse in the morning, denies any claudications symptoms. No visual changes. She has been taking tylenol and ibuprofen which have not significantly improved the pain. Her last dose was last evening. She has been recording her temperatures at home and has been afebrile. No cough or shortness of breath as well. When asked about voice change, she notes it has maybe become more raspy. She denies dizziness or hearing loss.             Review of Systems   Constitutional:  Negative for activity change, chills, diaphoresis, fatigue and fever.   HENT:  Positive for drooling, ear pain, facial swelling, tinnitus, trouble swallowing and voice change. Negative for congestion, dental problem, hearing loss, rhinorrhea and sore throat.    Eyes:  Negative for redness and visual disturbance.   Respiratory:  Negative for cough and shortness of breath.    Cardiovascular:  Negative for chest pain and palpitations.   Gastrointestinal:  Negative for abdominal pain, constipation, diarrhea, nausea and vomiting.   Musculoskeletal:  Negative for arthralgias, joint swelling and myalgias.   Skin:  Negative for rash.   Neurological:  Positive for headaches. Negative for dizziness, speech difficulty, weakness, light-headedness and numbness.   Psychiatric/Behavioral:  Negative for agitation. The patient is not nervous/anxious.    All other systems reviewed and are negative.          Objective       ED Triage Vitals [11/29/24 1109]   Temperature Pulse Blood Pressure Respirations SpO2 Patient Position - Orthostatic VS   98 °F (36.7 °C) 89 141/67 18 99 % Sitting      Temp Source Heart Rate Source BP Location FiO2 (%) Pain Score    Oral -- Right  arm -- --      Vitals      Date and Time Temp Pulse SpO2 Resp BP Pain Score FACES Pain Rating User   11/29/24 1109 98 °F (36.7 °C) 89 99 % 18 141/67 -- -- SE            Physical Exam  Vitals reviewed.   Constitutional:       Appearance: She is well-developed. She is not ill-appearing or diaphoretic.   HENT:      Head: Normocephalic and atraumatic.      Nose: No congestion or rhinorrhea.      Mouth/Throat:      Mouth: Mucous membranes are moist. No oral lesions.      Pharynx: Uvula midline. No oropharyngeal exudate, posterior oropharyngeal erythema or uvula swelling.      Comments: No tonsillar abscess noted  Eyes:      Conjunctiva/sclera: Conjunctivae normal.      Pupils: Pupils are equal, round, and reactive to light.   Neck:      Comments: Some swelling in the submandibular region  Pain near the edge of her mandibular jaw on the left side. No fluctuance or crepitus  Cardiovascular:      Rate and Rhythm: Normal rate and regular rhythm.      Heart sounds: Normal heart sounds.      Comments: No chest wall crepitus  Pulmonary:      Effort: Pulmonary effort is normal. No respiratory distress.      Breath sounds: Normal breath sounds. No stridor.   Skin:     General: Skin is warm and dry.   Neurological:      General: No focal deficit present.      Mental Status: She is alert.         Results Reviewed       None            No orders to display       Procedures    ED Medication and Procedure Management   Prior to Admission Medications   Prescriptions Last Dose Informant Patient Reported? Taking?   Calcium Carb-Cholecalciferol (Calcium 1000 + D) 1000-20 MG-MCG TABS  Self Yes No   Cholecalciferol (VITAMIN D) 50 MCG (2000 UT) tablet  Self Yes No   Sig: Take 2,000 Units by mouth daily   Magnesium 500 MG CAPS  Self Yes No   Multiple Vitamins-Minerals (MULTIVITAMIN ADULT PO)  Self Yes No   Sig: Take 1 tablet by mouth daily   Omega-3 1000 MG CAPS  Self Yes No   Turmeric 500 MG TABS  Self Yes No   Sig: Take 1 tablet by mouth  daily   Patient not taking: Reported on 1/15/2024   vitamin E, tocopherol, 400 units capsule  Self Yes No      Facility-Administered Medications: None     Discharge Medication List as of 11/29/2024 12:09 PM        START taking these medications    Details   naproxen (Naprosyn) 500 mg tablet Take 1 tablet (500 mg total) by mouth 2 (two) times a day as needed for mild pain, Starting Fri 11/29/2024, Normal           CONTINUE these medications which have NOT CHANGED    Details   Calcium Carb-Cholecalciferol (Calcium 1000 + D) 1000-20 MG-MCG TABS Historical Med      Cholecalciferol (VITAMIN D) 50 MCG (2000 UT) tablet Take 2,000 Units by mouth daily, Historical Med      Magnesium 500 MG CAPS Historical Med      Multiple Vitamins-Minerals (MULTIVITAMIN ADULT PO) Take 1 tablet by mouth daily, Historical Med      [place]-3 1000 MG CAPS Historical Med      Turmeric 500 MG TABS Take 1 tablet by mouth daily, Historical Med      vitamin E, tocopherol, 400 units capsule Historical Med             ED SEPSIS DOCUMENTATION   Time reflects when diagnosis was documented in both MDM as applicable and the Disposition within this note       Time User Action Codes Description Comment    11/29/2024 12:05 PM Mary Ford Add [K11.20] Submandibular sialoadenitis                  Mary Ford MD  11/29/24 6940

## 2024-11-29 NOTE — DISCHARGE INSTRUCTIONS
We recommend sucking on some sour candies to help with your pain in addition to naproxen as needed (ordered for your pharmacy). We also sent in a referral to see ENT.

## 2024-11-29 NOTE — ED ATTENDING ATTESTATION
11/29/2024  I, Gray Vidales MD, saw and evaluated the patient. I have discussed the patient with the resident/non-physician practitioner and agree with the resident's/non-physician practitioner's findings, Plan of Care, and MDM as documented in the resident's/non-physician practitioner's note, except where noted. All available labs and Radiology studies were reviewed.  I was present for key portions of any procedure(s) performed by the resident/non-physician practitioner and I was immediately available to provide assistance.       At this point I agree with the current assessment done in the Emergency Department.  I have conducted an independent evaluation of this patient a history and physical is as follows:  Briefly, 64-year-old female presenting with several days of sore throat as well as left jaw pain.  Patient states that she had a mild sore throat, has also had some nasal congestion, also states that she opened her mouth 2 days ago and heard something pop in the left jawbone.  She notes a history of TMJ, thinks that this may be worsening of that although she is also concerned that she may have some swelling in the left lower jaw.  She denies fevers, nausea, vomiting, trauma, chest pain, shortness of breath, other symptoms.  On examination, mild erythema the posterior pharynx, no swelling, no uvular deviation, no blood in the soft palate.  Dentition appears intact, no intraoral swelling noted, no pus noted at the salivary ducts.  Mild tenderness to palpation overlying the left TMJ, no clicking or popping, no abnormal motion with ranging of the jaw.  Small swelling noted in the left submandibular area, soft, not brawny, mildly tender to palpation.  Overall most consistent with sialoadenitis of the submandibular gland on the left, with lymphadenopathy also considered.  Exacerbation of known TMJ also considered as a contributor in the differential diagnosis.  Low suspicion for deep space abscess, Ludewig's  angina, PTA, RPA, otitis, other acute life threat.  Counseled regarding suspected diagnosis, as well as conservative management at home including sialagogues, treated symptomatically, discharged with strict return precautions and follow-up primary care doctor as well as otolaryngology.    ED Course         Critical Care Time  Procedures

## 2025-06-15 PROBLEM — F43.20 ADJUSTMENT DISORDER: Status: RESOLVED | Noted: 2020-02-10 | Resolved: 2025-06-15

## 2025-06-15 NOTE — PROGRESS NOTES
[facility] INTERNAL MEDICINE- [place]  OFFICE VISIT     PATIENT INFORMATION     Alison Meraz   65 y.o. female   MRN: [id number]    ASSESSMENT/PLAN     Assessment & Plan  Incomplete bladder emptying  Patient presents to clinic with concern of incomplete bladder emptying.  She states that she will feel the need to urinate and then go to the bathroom show after straining hard to initiate urine.  She will then urinate without difficulty and after urinating she will feel like she still has a pressure in her pelvis as if she still has to urinate.  She has had issues with dysuria and frequent UTIs in the past when she was younger but has not had any issue recently.  She denies any dysuria with urination.  No blood in her urine.  No bulges or masses.  For patient's symptoms of incomplete bladder emptying we will perform ultrasound of kidney and bladder with PVRs.  Orders:    US kidney and bladder with pvr; Future    Submandibular sialoadenitis  Patient with history of submandibular sialoadenitis seen in emergency department in November 2024.  She was then seen by ENT in office on 12/16/2024 and at that time symptoms resolved and she had no further issue.  Plan was to perform CT if symptoms recur however they have not done so.  Orders:    CBC and differential; Future    Comprehensive metabolic panel; Future    Vitamin D deficiency  History of vitamin D deficiency.  Will order new vitamin D levels to provide patient with further guidance for supplementation if needed.  Orders:    Vitamin D 25 hydroxy; Future    Encounter for screening mammogram for breast cancer  Discussed screening with patient.  She would prefer to undergo mammography every 2 years at this time.  Order placed so that she may schedule this when she is ready.  Orders:    Mammo screening bilateral w 3d and cad; Future    Encounter for lipid screening for cardiovascular disease    Orders:    Lipid panel; Future    Screening for diabetes  "mellitus    Orders:    Hemoglobin A1C With EAG; Future         HEALTH MAINTENANCE     Immunization History   Administered Date(s) Administered    Tdap 01/07/2013         CHIEF COMPLAINT     Chief Complaint   Patient presents with    Establish Care      HISTORY OF PRESENT ILLNESS     Ms. Alison Meraz is a 65-year-old female with past medical history of dysuria and COVID-19 infection.  She presents to clinic today for follow-up of chronic conditions and to establish care with a new primary care physician.  Patient has not previously been established with a [facility] internal medicine physician.    Patient was previously seeing family practice through [facility].  She was last in her primary care physician's office on 11/27/2024 for an acute visit for sore throat.  Prior to that she had her annual exam on 01/15/2024.  She underwent routine screenings and mammograms.    REVIEW OF SYSTEMS     Review of Systems   Constitutional:  Negative for chills and fever.   HENT:  Negative for congestion, rhinorrhea and sore throat.    Respiratory:  Negative for cough, shortness of breath and wheezing.    Cardiovascular:  Negative for chest pain and leg swelling.   Gastrointestinal:  Negative for abdominal distention, abdominal pain, constipation, diarrhea, nausea and vomiting.   Genitourinary:  Negative for difficulty urinating and dysuria.   Musculoskeletal:  Negative for arthralgias and back pain.   Skin:  Negative for rash and wound.   Neurological:  Negative for dizziness, syncope, weakness, light-headedness and headaches.   Psychiatric/Behavioral:  Negative for agitation, behavioral problems and confusion.      OBJECTIVE     Vitals:    06/16/25 0936   BP: 126/78   Pulse: 87   SpO2: 98%   Weight: 67.1 kg (148 lb)   Height: 5' 5\" (1.651 m)     Physical Exam  Constitutional:       Appearance: Normal appearance.   HENT:      Right Ear: External ear normal.      Left Ear: External ear normal.     Cardiovascular:      Rate and " Rhythm: Normal rate and regular rhythm.      Pulses: Normal pulses.      Heart sounds: Normal heart sounds. No murmur heard.  Pulmonary:      Effort: Pulmonary effort is normal. No respiratory distress.      Breath sounds: Normal breath sounds. No wheezing, rhonchi or rales.   Abdominal:      General: Abdomen is flat. Bowel sounds are normal. There is no distension.      Palpations: Abdomen is soft.      Tenderness: There is no abdominal tenderness.     Musculoskeletal:      Right lower leg: No edema.      Left lower leg: No edema.     Skin:     General: Skin is warm and dry.     Neurological:      Mental Status: She is alert and oriented to person, place, and time.     Psychiatric:         Mood and Affect: Mood normal.         Behavior: Behavior normal.         Thought Content: Thought content normal.       CURRENT MEDICATIONS   Current Medications[1]    PAST MEDICAL & SURGICAL HISTORY   Past Medical History[2]  Past Surgical History[3]  SOCIAL & FAMILY HISTORY     Social History     Socioeconomic History    Marital status: /Civil Union     Spouse name: Not on file    Number of children: Not on file    Years of education: Not on file    Highest education level: Not on file   Occupational History    Not on file   Tobacco Use    Smoking status: Never    Smokeless tobacco: Never   Vaping Use    Vaping status: Never Used   Substance and Sexual Activity    Alcohol use: Not Currently     Alcohol/week: 1.0 standard drink of alcohol     Comment: No drinks since 10/2023    Drug use: No    Sexual activity: Not Currently     Partners: Male     Birth control/protection: Post-menopausal   Other Topics Concern    Not on file   Social History Narrative    Not on file     Social Drivers of Health     Financial Resource Strain: Low Risk  (1/15/2024)    Overall Financial Resource Strain (CARDIA)     Difficulty of Paying Living Expenses: Not hard at all   Food Insecurity: No Food Insecurity (1/15/2024)    Nursing - Inadequate  Food Risk Classification     Worried About Running Out of Food in the Last Year: Never true     Ran Out of Food in the Last Year: Never true     Ran Out of Food in the Last Year: Not on file   Transportation Needs: No Transportation Needs (1/15/2024)    PRAPARE - Transportation     Lack of Transportation (Medical): No     Lack of Transportation (Non-Medical): No   Physical Activity: Sufficiently Active (1/15/2024)    Exercise Vital Sign     Days of Exercise per Week: 5 days     Minutes of Exercise per Session: 60 min   Stress: No Stress Concern Present (11/27/2024)    [facility] Occupational [employer] - Occupational Stress Questionnaire     Feeling of Stress : Not at all   Social Connections: Moderately Isolated (1/15/2024)    Social Connection and Isolation Panel     Frequency of Communication with Friends and Family: More than three times a week     Frequency of Social Gatherings with Friends and Family: More than three times a week     Attends Rastafari Services: Never     Active Member of Clubs or Organizations: No     Attends Club or Organization Meetings: Never     Marital Status:    Intimate Partner Violence: Not At Risk (1/15/2024)    Humiliation, Afraid, Rape, and Kick questionnaire     Fear of Current or Ex-Partner: No     Emotionally Abused: No     Physically Abused: No     Sexually Abused: No   Housing Stability: Unknown (1/15/2024)    Housing Stability Vital Sign     Unable to Pay for Housing in the Last Year: No     Number of Times Moved in the Last Year: Not on file     Homeless in the Last Year: No     Social History     Substance and Sexual Activity   Alcohol Use Not Currently    Alcohol/week: 1.0 standard drink of alcohol    Comment: No drinks since 10/2023       Social History     Substance and Sexual Activity   Drug Use No     Tobacco Use History[4]  Family History[5]  ==  Abdifatah Taalmantes DO  [facility]  [address] [place] [address] [address] [place] [address]  [id number]  Office: [phone]  Fax: [phone]         [1]   Current Outpatient Medications:     Biotin 1000 MCG tablet, , Disp: , Rfl:     Calcium Carb-Cholecalciferol (Calcium 1000 + D) 1000-20 MG-MCG TABS, , Disp: , Rfl:     Cholecalciferol (VITAMIN D) 50 MCG (2000 UT) tablet, Take 2,000 Units by mouth in the morning., Disp: , Rfl:     Cranberry 250 MG CAPS, , Disp: , Rfl:     Magnesium 500 MG CAPS, , Disp: , Rfl:     Multiple Vitamins-Minerals (MULTIVITAMIN ADULT PO), Take 1 tablet by mouth in the morning., Disp: , Rfl:     Omega-3 1000 MG CAPS, , Disp: , Rfl:     Red Yeast Rice 600 MG CAPS, , Disp: , Rfl:     vitamin E, tocopherol, 400 units capsule, , Disp: , Rfl:     naproxen (Naprosyn) 500 mg tablet, Take 1 tablet (500 mg total) by mouth 2 (two) times a day as needed for mild pain, Disp: 30 tablet, Rfl: 0  [2]   Past Medical History:  Diagnosis Date    Anxiety disorder     Last Assessed:11/12/2013    Hematuria     Last Assessed:12/10/2012    Kidney stone     Lyme disease     Last Assessed:12/10/2012    Migraine     Last Assessed:3/20/2014    TMJ dysfunction 1980    Vitamin D deficiency     Last Assessed:3/20/2014   [3]   Past Surgical History:  Procedure Laterality Date    CHOLECYSTECTOMY      MANDIBLE FRACTURE SURGERY      MANDIBLE SURGERY      wired after broken    TUBAL LIGATION      WISDOM TOOTH EXTRACTION      WRIST SURGERY     [4]   Social History  Tobacco Use   Smoking Status Never   Smokeless Tobacco Never   [5]   Family History  Problem Relation Name Age of Onset    Breast cancer Mother Gretchen Lam 76    Hypothyroidism Mother Gretchen Lam     Stroke Mother Gretchen Lam     Heart disease Mother Gretchen Lam     Thyroid disease Mother Gretchen Lam     Arthritis Mother Gretchen Lam     Cancer Mother Gretchen Lam     Depression Father Merlin Parsons     Suicide Attempts Father Merlin Parsons     Other Sister          renal disease    No Known Problems Daughter      Diabetes Maternal  Grandmother [place] Parons     Diabetes Maternal Grandfather Satya Diaz     Hypertension Maternal Grandfather Satya Diaz     Diabetes Paternal Grandmother Satya Diaz     Hypertension Paternal Grandmother Satya Diaz     Diabetes Paternal Grandfather      Prostate cancer Brother Merlin C Parsons     Bipolar disorder Brother Merlin C Parsons     Leukemia Maternal Aunt      No Known Problems Paternal Aunt

## 2025-06-16 ENCOUNTER — OFFICE VISIT (OUTPATIENT)
Age: 65
End: 2025-06-16
Payer: COMMERCIAL

## 2025-06-16 VITALS
WEIGHT: 148 LBS | HEART RATE: 87 BPM | HEIGHT: 65 IN | SYSTOLIC BLOOD PRESSURE: 126 MMHG | BODY MASS INDEX: 24.66 KG/M2 | OXYGEN SATURATION: 98 % | DIASTOLIC BLOOD PRESSURE: 78 MMHG

## 2025-06-16 DIAGNOSIS — Z13.220 ENCOUNTER FOR LIPID SCREENING FOR CARDIOVASCULAR DISEASE: ICD-10-CM

## 2025-06-16 DIAGNOSIS — Z13.6 ENCOUNTER FOR LIPID SCREENING FOR CARDIOVASCULAR DISEASE: ICD-10-CM

## 2025-06-16 DIAGNOSIS — Z13.1 SCREENING FOR DIABETES MELLITUS: ICD-10-CM

## 2025-06-16 DIAGNOSIS — R33.9 INCOMPLETE BLADDER EMPTYING: Primary | ICD-10-CM

## 2025-06-16 DIAGNOSIS — E55.9 VITAMIN D DEFICIENCY: ICD-10-CM

## 2025-06-16 DIAGNOSIS — Z12.31 ENCOUNTER FOR SCREENING MAMMOGRAM FOR BREAST CANCER: ICD-10-CM

## 2025-06-16 DIAGNOSIS — K11.20 SUBMANDIBULAR SIALOADENITIS: ICD-10-CM

## 2025-06-16 PROCEDURE — 99204 OFFICE O/P NEW MOD 45 MIN: CPT | Performed by: STUDENT IN AN ORGANIZED HEALTH CARE EDUCATION/TRAINING PROGRAM

## 2025-06-16 RX ORDER — AMPICILLIN TRIHYDRATE 250 MG
CAPSULE ORAL
COMMUNITY
Start: 2024-01-01

## 2025-06-16 RX ORDER — BIOTIN 1 MG
TABLET ORAL
COMMUNITY
Start: 2025-01-01

## 2025-06-16 RX ORDER — CRANBERRY FRUIT EXTRACT 250 MG
CAPSULE ORAL
COMMUNITY

## 2025-06-16 NOTE — ASSESSMENT & PLAN NOTE
History of vitamin D deficiency.  Will order new vitamin D levels to provide patient with further guidance for supplementation if needed.  Orders:    Vitamin D 25 hydroxy; Future

## 2025-06-21 LAB
25(OH)D3 SERPL-MCNC: 46 NG/ML (ref 30–100)
ALBUMIN SERPL-MCNC: 4.4 G/DL (ref 3.6–5.1)
ALBUMIN/GLOB SERPL: 2.1 (CALC) (ref 1–2.5)
ALP SERPL-CCNC: 53 U/L (ref 37–153)
ALT SERPL-CCNC: 15 U/L (ref 6–29)
AST SERPL-CCNC: 15 U/L (ref 10–35)
BASOPHILS # BLD AUTO: 43 CELLS/UL (ref 0–200)
BASOPHILS NFR BLD AUTO: 0.7 %
BILIRUB SERPL-MCNC: 0.3 MG/DL (ref 0.2–1.2)
BUN SERPL-MCNC: 23 MG/DL (ref 7–25)
BUN/CREAT SERPL: ABNORMAL (CALC) (ref 6–22)
CALCIUM SERPL-MCNC: 9.1 MG/DL (ref 8.6–10.4)
CHLORIDE SERPL-SCNC: 104 MMOL/L (ref 98–110)
CHOLEST SERPL-MCNC: 203 MG/DL
CHOLEST/HDLC SERPL: 3.8 (CALC)
CO2 SERPL-SCNC: 29 MMOL/L (ref 20–32)
CREAT SERPL-MCNC: 0.78 MG/DL (ref 0.5–1.05)
EOSINOPHIL # BLD AUTO: 61 CELLS/UL (ref 15–500)
EOSINOPHIL NFR BLD AUTO: 1 %
ERYTHROCYTE [DISTWIDTH] IN BLOOD BY AUTOMATED COUNT: 13.8 % (ref 11–15)
EST. AVERAGE GLUCOSE BLD GHB EST-MCNC: 120 MG/DL
EST. AVERAGE GLUCOSE BLD GHB EST-SCNC: 6.6 MMOL/L
GFR/BSA.PRED SERPLBLD CYS-BASED-ARV: 85 ML/MIN/1.73M2
GLOBULIN SER CALC-MCNC: 2.1 G/DL (CALC) (ref 1.9–3.7)
GLUCOSE SERPL-MCNC: 100 MG/DL (ref 65–99)
HBA1C MFR BLD: 5.8 %
HCT VFR BLD AUTO: 42 % (ref 35–45)
HDLC SERPL-MCNC: 53 MG/DL
HGB BLD-MCNC: 13.3 G/DL (ref 11.7–15.5)
LDLC SERPL CALC-MCNC: 127 MG/DL (CALC)
LYMPHOCYTES # BLD AUTO: 2660 CELLS/UL (ref 850–3900)
LYMPHOCYTES NFR BLD AUTO: 43.6 %
MCH RBC QN AUTO: 30 PG (ref 27–33)
MCHC RBC AUTO-ENTMCNC: 31.7 G/DL (ref 32–36)
MCV RBC AUTO: 94.6 FL (ref 80–100)
MONOCYTES # BLD AUTO: 451 CELLS/UL (ref 200–950)
MONOCYTES NFR BLD AUTO: 7.4 %
NEUTROPHILS # BLD AUTO: 2885 CELLS/UL (ref 1500–7800)
NEUTROPHILS NFR BLD AUTO: 47.3 %
NONHDLC SERPL-MCNC: 150 MG/DL (CALC)
PLATELET # BLD AUTO: 210 THOUSAND/UL (ref 140–400)
PMV BLD REES-ECKER: 10.5 FL (ref 7.5–12.5)
POTASSIUM SERPL-SCNC: 4.5 MMOL/L (ref 3.5–5.3)
PROT SERPL-MCNC: 6.5 G/DL (ref 6.1–8.1)
RBC # BLD AUTO: 4.44 MILLION/UL (ref 3.8–5.1)
SODIUM SERPL-SCNC: 139 MMOL/L (ref 135–146)
TRIGL SERPL-MCNC: 124 MG/DL
WBC # BLD AUTO: 6.1 THOUSAND/UL (ref 3.8–10.8)

## 2025-06-23 ENCOUNTER — RESULTS FOLLOW-UP (OUTPATIENT)
Age: 65
End: 2025-06-23

## 2025-06-23 DIAGNOSIS — R33.9 INCOMPLETE BLADDER EMPTYING: Primary | ICD-10-CM

## 2025-06-23 DIAGNOSIS — R33.9 URINARY RETENTION: ICD-10-CM

## 2025-07-01 ENCOUNTER — HOSPITAL ENCOUNTER (OUTPATIENT)
Dept: ULTRASOUND IMAGING | Facility: HOSPITAL | Age: 65
Discharge: HOME | End: 2025-07-01
Attending: STUDENT IN AN ORGANIZED HEALTH CARE EDUCATION/TRAINING PROGRAM
Payer: COMMERCIAL

## 2025-07-01 DIAGNOSIS — R33.9 INCOMPLETE BLADDER EMPTYING: ICD-10-CM

## 2025-07-01 PROCEDURE — 76770 US EXAM ABDO BACK WALL COMP: CPT

## 2025-07-24 ENCOUNTER — OFFICE VISIT (OUTPATIENT)
Dept: UROLOGY | Facility: AMBULATORY SURGERY CENTER | Age: 65
End: 2025-07-24
Payer: COMMERCIAL

## 2025-07-24 VITALS
HEIGHT: 65 IN | HEART RATE: 65 BPM | WEIGHT: 146 LBS | SYSTOLIC BLOOD PRESSURE: 124 MMHG | OXYGEN SATURATION: 99 % | BODY MASS INDEX: 24.32 KG/M2 | DIASTOLIC BLOOD PRESSURE: 80 MMHG

## 2025-07-24 DIAGNOSIS — R33.9 INCOMPLETE BLADDER EMPTYING: ICD-10-CM

## 2025-07-24 DIAGNOSIS — R33.9 URINARY RETENTION: Primary | ICD-10-CM

## 2025-07-24 LAB
POST-VOID RESIDUAL VOLUME, ML POC: 999 ML
SL AMB  POCT GLUCOSE, UA: NORMAL
SL AMB LEUKOCYTE ESTERASE,UA: NORMAL
SL AMB POCT BILIRUBIN,UA: NORMAL
SL AMB POCT BLOOD,UA: NORMAL
SL AMB POCT CLARITY,UA: CLEAR
SL AMB POCT COLOR,UA: YELLOW
SL AMB POCT KETONES,UA: NORMAL
SL AMB POCT NITRITE,UA: NORMAL
SL AMB POCT PH,UA: 6.5
SL AMB POCT SPECIFIC GRAVITY,UA: 1
SL AMB POCT URINE PROTEIN: NORMAL
SL AMB POCT UROBILINOGEN: 0.2

## 2025-07-24 PROCEDURE — 81002 URINALYSIS NONAUTO W/O SCOPE: CPT | Performed by: UROLOGY

## 2025-07-24 PROCEDURE — 99204 OFFICE O/P NEW MOD 45 MIN: CPT | Performed by: UROLOGY

## 2025-07-24 PROCEDURE — 51798 US URINE CAPACITY MEASURE: CPT | Performed by: UROLOGY

## 2025-07-24 NOTE — PROGRESS NOTES
"Name: Alison Meraz      : 1960      MRN: [id number]  Encounter Provider: Ang Mock MD  Encounter Date: 2025   Encounter department: [facility] [place]  :  Assessment & Plan  Urinary retention  Discussed unfortunately that patient has idiopathic urinary retention.  Unclear etiology.  Discussed that she may certainly have neurogenic bladder.  Recommend bladder drainage and bladder rest for at least 10 to 14 days.  Then plan appointment nurse visit for self-catheterization teaching.  Hopefully she will be able to void spontaneously however she understands that this may not be the case.    May also consider neurology evaluation possible imaging of the central nervous system to rule out conditions such as multiple sclerosis.  Orders:  •  Ambulatory Referral to Urology  •  POCT urine dip  •  POCT Measure PVR    Incomplete bladder emptying    Orders:  •  Ambulatory Referral to Urology  •  POCT urine dip  •  POCT Measure PVR        History of Present Illness   Alison Meraz is a 65 y.o. female referred for evaluation of urinary retention.  She reports the feeling of abdominal bloating and diminished urinary output over the past several weeks.  She has not had any constitutional symptoms.  No generalized illnesses.  She denies any neurologic conditions.  She is not diabetic.  She has no problems with constipation.  No visual disturbances.    An ultrasound revealed normal kidneys with no hydronephrosis, but significant residual urine greater than 1600 mL.    Veloz catheter placed in the office today under sterile conditions draining greater than 1600 mL clear yellow urine.    Review of Systems       Objective   /80 (BP Location: Left arm, Patient Position: Sitting, Cuff Size: Adult)   Pulse 65   Ht 5' 5\" (1.651 m)   Wt 66.2 kg (146 lb)   SpO2 99%   BMI 24.30 kg/m²     Physical Exam  Vitals reviewed.   Constitutional:       Appearance: She is well-developed. " "  HENT:      Head: Normocephalic and atraumatic.     Eyes:      Conjunctiva/sclera: Conjunctivae normal.       Cardiovascular:      Rate and Rhythm: Normal rate.   Pulmonary:      Effort: Pulmonary effort is normal.   Abdominal:      Palpations: Abdomen is soft.     Musculoskeletal:         General: Normal range of motion.     Skin:     General: Skin is warm and dry.     Neurological:      Mental Status: She is alert and oriented to person, place, and time.     Psychiatric:         Mood and Affect: Mood normal.           Results   No results found for: \"PSA\"  Lab Results   Component Value Date    CALCIUM 9.1 06/20/2025     07/02/2016    K 4.5 06/20/2025    CO2 29 06/20/2025     06/20/2025    BUN 23 06/20/2025    CREATININE 0.78 06/20/2025     Lab Results   Component Value Date    WBC 6.1 06/20/2025    HGB 13.3 06/20/2025    HCT 42.0 06/20/2025    MCV 94.6 06/20/2025     06/20/2025       Office Urine Dip  No results found for this or any previous visit (from the past hour).        "

## 2025-07-25 ENCOUNTER — TELEPHONE (OUTPATIENT)
Age: 65
End: 2025-07-25

## 2025-07-25 NOTE — TELEPHONE ENCOUNTER
Patient seen by >Dr Mock in [place]      Patient called stating she was just seen yesterday with Dr. Mock and a wang catheter was placed and she is to have it for at least 10 to 14 days and then have a nurse visit for self catheterization teaching.     Patient is available 08/04/25 and 08/07/25.  Please review and call patient.     Patient can be reached at [phone]

## 2025-07-25 NOTE — TELEPHONE ENCOUNTER
Patient scheduled for CIC teaching 8/4 @ 1:30 - need to know how many times per day patient should be doing CIC

## 2025-07-27 DIAGNOSIS — R33.9 URINARY RETENTION: Primary | ICD-10-CM

## 2025-07-27 DIAGNOSIS — R33.9 INCOMPLETE BLADDER EMPTYING: ICD-10-CM

## 2025-08-03 ENCOUNTER — NURSE TRIAGE (OUTPATIENT)
Dept: OTHER | Facility: OTHER | Age: 65
End: 2025-08-03

## 2025-08-04 ENCOUNTER — PROCEDURE VISIT (OUTPATIENT)
Dept: UROLOGY | Facility: AMBULATORY SURGERY CENTER | Age: 65
End: 2025-08-04
Payer: COMMERCIAL

## 2025-08-04 ENCOUNTER — HOSPITAL ENCOUNTER (EMERGENCY)
Facility: HOSPITAL | Age: 65
Discharge: HOME | End: 2025-08-04
Attending: EMERGENCY MEDICINE | Admitting: EMERGENCY MEDICINE
Payer: COMMERCIAL

## 2025-08-04 VITALS
DIASTOLIC BLOOD PRESSURE: 70 MMHG | SYSTOLIC BLOOD PRESSURE: 120 MMHG | OXYGEN SATURATION: 100 % | WEIGHT: 143 LBS | BODY MASS INDEX: 22.98 KG/M2 | HEIGHT: 66 IN | HEART RATE: 77 BPM

## 2025-08-04 DIAGNOSIS — R33.9 URINARY RETENTION: Primary | ICD-10-CM

## 2025-08-04 PROCEDURE — 99211 OFF/OP EST MAY X REQ PHY/QHP: CPT

## 2025-08-05 ENCOUNTER — TELEPHONE (OUTPATIENT)
Dept: UROLOGY | Facility: AMBULATORY SURGERY CENTER | Age: 65
End: 2025-08-05